# Patient Record
Sex: FEMALE | Race: WHITE | NOT HISPANIC OR LATINO | Employment: PART TIME | URBAN - METROPOLITAN AREA
[De-identification: names, ages, dates, MRNs, and addresses within clinical notes are randomized per-mention and may not be internally consistent; named-entity substitution may affect disease eponyms.]

---

## 2017-12-20 ENCOUNTER — OFFICE VISIT (OUTPATIENT)
Dept: URGENT CARE | Facility: RETAIL CLINIC | Age: 37
End: 2017-12-20
Payer: COMMERCIAL

## 2017-12-20 VITALS — TEMPERATURE: 98.4 F | SYSTOLIC BLOOD PRESSURE: 112 MMHG | HEART RATE: 80 BPM | DIASTOLIC BLOOD PRESSURE: 72 MMHG

## 2017-12-20 DIAGNOSIS — R52 BODY ACHES: ICD-10-CM

## 2017-12-20 DIAGNOSIS — J02.9 ACUTE PHARYNGITIS, UNSPECIFIED ETIOLOGY: Primary | ICD-10-CM

## 2017-12-20 LAB
FLUAV AG UPPER RESP QL IA.RAPID: NEGATIVE
FLUBV AG UPPER RESP QL IA.RAPID: NEGATIVE
S PYO AG THROAT QL IA.RAPID: NEGATIVE

## 2017-12-20 PROCEDURE — 99213 OFFICE O/P EST LOW 20 MIN: CPT | Performed by: PHYSICIAN ASSISTANT

## 2017-12-20 PROCEDURE — 87081 CULTURE SCREEN ONLY: CPT | Performed by: PHYSICIAN ASSISTANT

## 2017-12-20 PROCEDURE — 87880 STREP A ASSAY W/OPTIC: CPT | Mod: QW | Performed by: PHYSICIAN ASSISTANT

## 2017-12-20 PROCEDURE — 87804 INFLUENZA ASSAY W/OPTIC: CPT | Mod: QW | Performed by: PHYSICIAN ASSISTANT

## 2017-12-20 NOTE — PATIENT INSTRUCTIONS
Malu Dela Cruz PA-C will call with influenza test results within next 30 minutes  Rapid strep test today is negative.   Your throat culture is pending. [facility] will call you if positive results to start antibiotics at that time.  No phone call if strep culture is negative  Symptomatic treat with fluids, rest, salt water gargles, lozenges, and over the counter pain reliever, such as tylenol or ibuprofen as needed.    Remain out of activities/group settings/work when still symptomatic and until fever/body aches/chills and headache are gone for 24 hrs.. Rest. Vaporizer.  Go to the ER if any wheezing or shortness of breath develop.

## 2017-12-20 NOTE — PROGRESS NOTES
Chief Complaint   Patient presents with     Pharyngitis     x 2 days, pt did have a cough but now gone, body aches and chills x 2 days, pt not sure if feverish      SUBJECTIVE:  Esperanza Velarde is a 37 year old female with a chief complaint of sore throat.  Onset of symptoms was 2 day(s) ago.    Course of illness: sudden onset.  Severity moderate  Current and Associated symptoms: sore throat, body aches and chills for few days, cough earlier/but gone now  Treatment measures tried include Fluids.  Predisposing factors include none  Very healthy, no chronic issues.  Did not receive a flu shot this season    Past Medical History:   Diagnosis Date     Endometriosis      Infertility, female      No current outpatient prescriptions on file.      No Known Allergies     History   Smoking Status     Former Smoker   Smokeless Tobacco     Never Used       ROS:  CONSTITUTIONAL:POSITIVE  for chills and body aches and NEGATIVE  for fever   ENT/MOUTH: POSITIVE for sore throat and NEGATIVE for ear pain bilateral and nasal congestion  RESP:NEGATIVE for significant cough or wheezing    OBJECTIVE:   /72 (BP Location: Left arm)  Pulse 80  Temp 98.4  F (36.9  C) (Temporal)  GENERAL APPEARANCE: mildly ill appearing  EYES: conjunctiva clear  HENT: ear canals and TM's normal.  Nose pink with clear rhinorrhea.  Pharynx erythematous with no exudate noted.  NECK: supple, non-tender to palpation, no adenopathy noted  RESP: lungs clear to auscultation - no rales, rhonchi or wheezes  CV: regular rates and rhythm, normal S1 S2, no murmur notedl  SKIN: no suspicious lesions or rashes    Rapid Strep test is negative; await throat culture results.  Rapid influenza A neg, influenza B negative    ASSESSMENT:     Acute pharyngitis, unspecified etiology  Body aches    PLAN:   I called patient after office visit and discussed negative influenza A and negative influenza B test results. Already had given her a note for work.  Rapid strep test  today is negative.   Your throat culture is pending. [facility] will call you if positive results to start antibiotics at that time.  No phone call if strep culture is negative  Symptomatic treat with fluids, rest, salt water gargles, lozenges, and over the counter pain reliever, such as tylenol or ibuprofen as needed.    Remain out of activities/group settings/work when still symptomatic and until fever/body aches/chills and headache are gone for 24 hrs.. Rest. Vaporizer.  Go to the ER if any wheezing or shortness of breath develop.       JOS Correia [facility]

## 2017-12-20 NOTE — NURSING NOTE
"Chief Complaint   Patient presents with     Pharyngitis     x 2 days, pt did have a cough but now gone, body aches and chills x 2 days, pt not sure if feverish       Initial /72 (BP Location: Left arm)  Pulse 80  Temp 98.4  F (36.9  C) (Temporal) Estimated body mass index is 25.05 kg/(m^2) as calculated from the following:    Height as of 8/15/13: 5' 5.75\" (1.67 m).    Weight as of 8/15/13: 154 lb (69.9 kg).  Medication Reconciliation: complete    "

## 2017-12-20 NOTE — LETTER
December 20, 2017      Esperanza Velarde  [address] [address] [place] [id number]        To Whom It May Concern:    Esperanza Velarde was seen in our clinic today for acute illness. Please excuse from work missed due to this illness.       Sincerely,        Malu Dela Cruz PA-C

## 2017-12-20 NOTE — MR AVS SNAPSHOT
"              After Visit Summary   12/20/2017    Esperanza Velarde    MRN: [id number]           Patient Information     Date Of Birth          1980        Visit Information        Provider Department      12/20/2017 4:00 PM Malu Dela Cruz PA-C [facility] [place]        Today's Diagnoses     Acute pharyngitis, unspecified etiology    -  1    Body aches          Care Instructions    Malu Dela Cruz PA-C will call with influenza test results within next 30 minutes  Rapid strep test today is negative.   Your throat culture is pending. [facility] will call you if positive results to start antibiotics at that time.  No phone call if strep culture is negative  Symptomatic treat with fluids, rest, salt water gargles, lozenges, and over the counter pain reliever, such as tylenol or ibuprofen as needed.    Remain out of activities/group settings/work when still symptomatic and until fever/body aches/chills and headache are gone for 24 hrs.. Rest. Vaporizer.  Go to the ER if any wheezing or shortness of breath develop.                 Follow-ups after your visit        Who to contact     You can reach your care team any time of the day by calling [phone].  Notification of test results:  If you have an abnormal lab result, we will notify you by phone as soon as possible.         Additional Information About Your Visit        [employer] Information     [employer] lets you send messages to your doctor, view your test results, renew your prescriptions, schedule appointments and more. To sign up, go to [email or web address] . Click on \"Log in\" on the left side of the screen, which will take you to the Welcome page. Then click on \"Sign up Now\" on the right side of the page.     You will be asked to enter the access code listed below, as well as some personal information. Please follow the directions to create your username and password.     Your access code is: [email or web address]  Expires: 3/20/2018  4:27 PM   "   Your access code will  in 90 days. If you need help or a new code, please call your [facility] clinic or [phone].        Care EveryWhere ID     This is your Care EveryWhere ID. This could be used by other organizations to access your [facility] medical records  [id number]        Your Vitals Were     Pulse Temperature                80 98.4  F (36.9  C) (Temporal)           Blood Pressure from Last 3 Encounters:   17 112/72   14 131/76   08/15/13 100/70    Weight from Last 3 Encounters:   08/15/13 154 lb (69.9 kg)              We Performed the Following     BETA STREP GROUP A R/O CULTURE     INFLUENZA A/B ANTIGEN     RAPID STREP SCREEN        Primary Care Provider Fax #    Physician No Ref-Primary [phone]       No address on file        Equal Access to Services     CARYN PETERSON : Sumit isidro Soguy, waaxda luqadaha, qaybta kaalmada adeegyada, iris cyr . So [facility] [phone].    ATENCIÓN: Si habla español, tiene a ha disposición servicios gratuitos de asistencia lingüística. Llame al [phone].    We comply with applicable federal civil rights laws and [place] laws. We do not discriminate on the basis of race, color, national origin, age, disability, sex, sexual orientation, or gender identity.            Thank you!     Thank you for choosing [facility]  for your care. Our goal is always to provide you with excellent care. Hearing back from our patients is one way we can continue to improve our services. Please take a few minutes to complete the written survey that you may receive in the mail after your visit with us. Thank you!             Your Updated Medication List - Protect others around you: Learn how to safely use, store and throw away your medicines at www.disposemymeds.org.      Notice  As of 2017  4:27 PM    You have not been prescribed any medications.

## 2017-12-22 LAB — BETA STREP CONFIRM: NORMAL

## 2019-09-12 NOTE — H&P (VIEW-ONLY)
SUBJECTIVE:   CC: Esperanza Velarde is an 38 year old woman who presents for preventive health visit.     Healthy Habits:     Getting at least 3 servings of Calcium per day:  NO    Bi-annual eye exam:  NO    Dental care twice a year:  Yes    Sleep apnea or symptoms of sleep apnea:  None    Diet:  Regular (no restrictions)    Frequency of exercise:  None    Taking medications regularly:  Yes    Medication side effects:  None    PHQ-2 Total Score: 1    Additional concerns today:  Yes    Rash on chest x 2 months- itches. Does not recall anything new. Does not seem to be spreading. Tried OTC Lotrimin cream without relief.    Had hysterectomy in 2014. Has been having some mood swings and decrease libido since. Was on hormones. Really doesn't want to go a back on those if she doesn't have to.     Today's PHQ-2 Score:   PHQ-2 ( 1999 Pfizer) 9/16/2019   Q1: Little interest or pleasure in doing things 0   Q2: Feeling down, depressed or hopeless 1   PHQ-2 Score 1   Q1: Little interest or pleasure in doing things Not at all   Q2: Feeling down, depressed or hopeless Several days   PHQ-2 Score 1       Abuse: Current or Past(Physical, Sexual or Emotional)- No  Do you feel safe in your environment? Yes    Social History     Tobacco Use     Smoking status: Current Every Day Smoker     Smokeless tobacco: Never Used   Substance Use Topics     Alcohol use: Yes     Comment: rare- 3 per week     If you drink alcohol do you typically have >3 drinks per day or >7 drinks per week? No    Alcohol Use 9/16/2019   Prescreen: >3 drinks/day or >7 drinks/week? No   Prescreen: >3 drinks/day or >7 drinks/week? -     Reviewed orders with patient.  Reviewed health maintenance and updated orders accordingly - Yes  BP Readings from Last 3 Encounters:   09/16/19 104/72   12/20/17 112/72   12/29/14 131/76    Wt Readings from Last 3 Encounters:   09/16/19 68.9 kg (152 lb)   08/15/13 69.9 kg (154 lb)                  Patient Active Problem List    Diagnosis     CARDIOVASCULAR SCREENING; LDL GOAL LESS THAN 160     Past Surgical History:   Procedure Laterality Date     HYSTERECTOMY, PAP NO LONGER INDICATED       TUBAL/ECTOPIC PREGNANCY  1998    Ectopic     TUBAL/ECTOPIC PREGNANCY  2009    Tube Remove       Social History     Tobacco Use     Smoking status: Current Every Day Smoker     Smokeless tobacco: Never Used   Substance Use Topics     Alcohol use: Yes     Comment: rare- 3 per week     Family History   Problem Relation Age of Onset     Lung Cancer Mother 68        mets every where by the time they found it     Cerebrovascular Disease Mother 68     Lung Cancer Father         agent orange     Colon Cancer Maternal Grandmother 50     Lung Cancer Maternal Grandmother          Current Outpatient Medications   Medication Sig Dispense Refill     nystatin-triamcinolone (MYCOLOG) 386106-1.1 UNIT/GM-% external ointment Apply topically 2 times daily 30 g 1     venlafaxine (EFFEXOR-XR) 37.5 MG 24 hr capsule Take 1 capsule (37.5 mg) by mouth daily 90 capsule 0     No Known Allergies    Mammogram Screening: Patient under age 50, mutual decision reflected in health maintenance.      Pertinent mammograms are reviewed under the imaging tab.  History of abnormal Pap smear: Status post benign hysterectomy. Health Maintenance and Surgical History updated.  PAP / HPV 8/15/2013   PAP NIL     Reviewed and updated as needed this visit by clinical staff  Tobacco  Allergies  Meds  Problems  Med Hx  Surg Hx  Fam Hx  Soc Hx          Reviewed and updated as needed this visit by Provider  Tobacco  Allergies  Meds  Problems  Med Hx  Surg Hx  Fam Hx  Soc Hx         Review of Systems   Constitutional: Negative for chills and fever.   HENT: Negative for congestion, ear pain, hearing loss and sore throat.    Eyes: Negative for pain and visual disturbance.   Respiratory: Negative for cough and shortness of breath.    Cardiovascular: Negative for chest pain, palpitations and  "peripheral edema.   Gastrointestinal: Negative for abdominal pain, constipation, diarrhea, heartburn, hematochezia and nausea.   Genitourinary: Negative for dysuria, frequency, genital sores, hematuria and urgency.   Musculoskeletal: Negative for arthralgias, joint swelling and myalgias.   Skin: Positive for rash.   Neurological: Negative for dizziness, weakness, headaches and paresthesias.   Psychiatric/Behavioral: Negative for mood changes. The patient is not nervous/anxious.         OBJECTIVE:   /72   Pulse 80   Temp 98.3  F (36.8  C) (Temporal)   Resp 16   Ht 1.727 m (5' 8\")   Wt 68.9 kg (152 lb)   LMP 07/02/2013   SpO2 99%   BMI 23.11 kg/m    Physical Exam  GENERAL: healthy, alert and no distress  EYES: Eyes grossly normal to inspection, PERRL and conjunctivae and sclerae normal  HENT: ear canals and TM's normal, nose and mouth without ulcers or lesions  NECK: no adenopathy, no asymmetry, masses, or scars and thyroid normal to palpation  RESP: lungs clear to auscultation - no rales, rhonchi or wheezes  BREAST: normal without masses, tenderness or nipple discharge and no palpable axillary masses or adenopathy  CV: regular rate and rhythm, normal S1 S2, no S3 or S4, no murmur, click or rub, no peripheral edema and peripheral pulses strong  ABDOMEN: soft, nontender, no hepatosplenomegaly, no masses and bowel sounds normal  MS: no gross musculoskeletal defects noted, no edema  SKIN: several areas of circular erythematous rash with scaling over anterior chest  NEURO: Normal strength and tone, mentation intact and speech normal  PSYCH: mentation appears normal, affect normal/bright    Diagnostic Test Results:  Labs reviewed in Epic  Results for orders placed or performed in visit on 09/16/19 (from the past 24 hour(s))   Lipid panel reflex to direct LDL Fasting   Result Value Ref Range    Cholesterol 158 <200 mg/dL    Triglycerides 58 <150 mg/dL    HDL Cholesterol 70 >49 mg/dL    LDL Cholesterol " "Calculated 76 <100 mg/dL    Non HDL Cholesterol 88 <130 mg/dL   Comprehensive metabolic panel (BMP + Alb, Alk Phos, ALT, AST, Total. Bili, TP)   Result Value Ref Range    Sodium 142 133 - 144 mmol/L    Potassium 3.7 3.4 - 5.3 mmol/L    Chloride 105 94 - 109 mmol/L    Carbon Dioxide 28 20 - 32 mmol/L    Anion Gap 9 3 - 14 mmol/L    Glucose 92 70 - 99 mg/dL    Urea Nitrogen 13 7 - 30 mg/dL    Creatinine 0.69 0.52 - 1.04 mg/dL    GFR Estimate >90 >60 mL/min/[1.73_m2]    GFR Estimate If Black >90 >60 mL/min/[1.73_m2]    Calcium 8.9 8.5 - 10.1 mg/dL    Bilirubin Total 0.3 0.2 - 1.3 mg/dL    Albumin 3.9 3.4 - 5.0 g/dL    Protein Total 7.4 6.8 - 8.8 g/dL    Alkaline Phosphatase 126 40 - 150 U/L    ALT 24 0 - 50 U/L    AST 18 0 - 45 U/L       ASSESSMENT/PLAN:   1. Routine general medical examination at a health care facility    2. Post menopausal syndrome  Discussed treatment options in detail today including risks associated with HRT. Patient will trial low dose Effexor at this time. Side effects discussed.   - venlafaxine (EFFEXOR-XR) 37.5 MG 24 hr capsule; Take 1 capsule (37.5 mg) by mouth daily  Dispense: 90 capsule; Refill: 0    3. Encounter for screening mammogram for breast cancer  - *MA Screening Digital Bilateral; Future    4. Family history of colon cancer  Strong family history. Will place orders for colonscopy.   - GASTROENTEROLOGY ADULT REF PROCEDURE ONLY    5. Lipid screening  - Lipid panel reflex to direct LDL Fasting    6. Screening for diabetes mellitus  - Comprehensive metabolic panel (BMP + Alb, Alk Phos, ALT, AST, Total. Bili, TP)    7. Rash  - nystatin-triamcinolone (MYCOLOG) 553883-8.1 UNIT/GM-% external ointment; Apply topically 2 times daily  Dispense: 30 g; Refill: 1    COUNSELING:  Reviewed preventive health counseling, as reflected in patient instructions    Estimated body mass index is 23.11 kg/m  as calculated from the following:    Height as of this encounter: 1.727 m (5' 8\").    Weight as of " this encounter: 68.9 kg (152 lb).       reports that she has been smoking.  She has never used smokeless tobacco.  Tobacco Cessation Action Plan: Information offered: Patient not interested at this time    Counseling Resources:  ATP IV Guidelines  Pooled Cohorts Equation Calculator  Breast Cancer Risk Calculator  FRAX Risk Assessment  ICSI Preventive Guidelines  Dietary Guidelines for Americans, 2010  USDA's MyPlate  ASA Prophylaxis  Lung CA Screening    Malu Rae PA-C  [facility]

## 2019-09-12 NOTE — PROGRESS NOTES
SUBJECTIVE:   CC: Esperanza Velarde is an 38 year old woman who presents for preventive health visit.     Healthy Habits:     Getting at least 3 servings of Calcium per day:  NO    Bi-annual eye exam:  NO    Dental care twice a year:  Yes    Sleep apnea or symptoms of sleep apnea:  None    Diet:  Regular (no restrictions)    Frequency of exercise:  None    Taking medications regularly:  Yes    Medication side effects:  None    PHQ-2 Total Score: 1    Additional concerns today:  Yes    Rash on chest x 2 months- itches. Does not recall anything new. Does not seem to be spreading. Tried OTC Lotrimin cream without relief.    Had hysterectomy in 2014. Has been having some mood swings and decrease libido since. Was on hormones. Really doesn't want to go a back on those if she doesn't have to.     Today's PHQ-2 Score:   PHQ-2 ( 1999 [employer]) 9/16/2019   Q1: Little interest or pleasure in doing things 0   Q2: Feeling down, depressed or hopeless 1   PHQ-2 Score 1   Q1: Little interest or pleasure in doing things Not at all   Q2: Feeling down, depressed or hopeless Several days   PHQ-2 Score 1       Abuse: Current or Past(Physical, Sexual or Emotional)- No  Do you feel safe in your environment? Yes    Social History     Tobacco Use     Smoking status: Current Every Day Smoker     Smokeless tobacco: Never Used   Substance Use Topics     Alcohol use: Yes     Comment: rare- 3 per week     If you drink alcohol do you typically have >3 drinks per day or >7 drinks per week? No    Alcohol Use 9/16/2019   Prescreen: >3 drinks/day or >7 drinks/week? No   Prescreen: >3 drinks/day or >7 drinks/week? -     Reviewed orders with patient.  Reviewed health maintenance and updated orders accordingly - Yes  BP Readings from Last 3 Encounters:   09/16/19 104/72   12/20/17 112/72   12/29/14 131/76    Wt Readings from Last 3 Encounters:   09/16/19 68.9 kg (152 lb)   08/15/13 69.9 kg (154 lb)                  Patient Active Problem List    Diagnosis     CARDIOVASCULAR SCREENING; LDL GOAL LESS THAN 160     Past Surgical History:   Procedure Laterality Date     HYSTERECTOMY, PAP NO LONGER INDICATED       TUBAL/ECTOPIC PREGNANCY  1998    Ectopic     TUBAL/ECTOPIC PREGNANCY  2009    Tube Remove       Social History     Tobacco Use     Smoking status: Current Every Day Smoker     Smokeless tobacco: Never Used   Substance Use Topics     Alcohol use: Yes     Comment: rare- 3 per week     Family History   Problem Relation Age of Onset     Lung Cancer Mother 68        mets every where by the time they found it     Cerebrovascular Disease Mother 68     Lung Cancer Father         agent orange     Colon Cancer Maternal Grandmother 50     Lung Cancer Maternal Grandmother          Current Outpatient Medications   Medication Sig Dispense Refill     nystatin-triamcinolone (MYCOLOG) 383348-2.1 UNIT/GM-% external ointment Apply topically 2 times daily 30 g 1     venlafaxine (EFFEXOR-XR) 37.5 MG 24 hr capsule Take 1 capsule (37.5 mg) by mouth daily 90 capsule 0     No Known Allergies    Mammogram Screening: Patient under age 50, mutual decision reflected in health maintenance.      Pertinent mammograms are reviewed under the imaging tab.  History of abnormal Pap smear: Status post benign hysterectomy. Health Maintenance and Surgical History updated.  PAP / HPV 8/15/2013   PAP NIL     Reviewed and updated as needed this visit by clinical staff  Tobacco  Allergies  Meds  Problems  Med Hx  Surg Hx  Fam Hx  Soc Hx          Reviewed and updated as needed this visit by Provider  Tobacco  Allergies  Meds  Problems  Med Hx  Surg Hx  Fam Hx  Soc Hx         Review of Systems   Constitutional: Negative for chills and fever.   HENT: Negative for congestion, ear pain, hearing loss and sore throat.    Eyes: Negative for pain and visual disturbance.   Respiratory: Negative for cough and shortness of breath.    Cardiovascular: Negative for chest pain, palpitations and  "peripheral edema.   Gastrointestinal: Negative for abdominal pain, constipation, diarrhea, heartburn, hematochezia and nausea.   Genitourinary: Negative for dysuria, frequency, genital sores, hematuria and urgency.   Musculoskeletal: Negative for arthralgias, joint swelling and myalgias.   Skin: Positive for rash.   Neurological: Negative for dizziness, weakness, headaches and paresthesias.   Psychiatric/Behavioral: Negative for mood changes. The patient is not nervous/anxious.         OBJECTIVE:   /72   Pulse 80   Temp 98.3  F (36.8  C) (Temporal)   Resp 16   Ht 1.727 m (5' 8\")   Wt 68.9 kg (152 lb)   LMP 07/02/2013   SpO2 99%   BMI 23.11 kg/m    Physical Exam  GENERAL: healthy, alert and no distress  EYES: Eyes grossly normal to inspection, PERRL and conjunctivae and sclerae normal  HENT: ear canals and TM's normal, nose and mouth without ulcers or lesions  NECK: no adenopathy, no asymmetry, masses, or scars and thyroid normal to palpation  RESP: lungs clear to auscultation - no rales, rhonchi or wheezes  BREAST: normal without masses, tenderness or nipple discharge and no palpable axillary masses or adenopathy  CV: regular rate and rhythm, normal S1 S2, no S3 or S4, no murmur, click or rub, no peripheral edema and peripheral pulses strong  ABDOMEN: soft, nontender, no hepatosplenomegaly, no masses and bowel sounds normal  MS: no gross musculoskeletal defects noted, no edema  SKIN: several areas of circular erythematous rash with scaling over anterior chest  NEURO: Normal strength and tone, mentation intact and speech normal  PSYCH: mentation appears normal, affect normal/bright    Diagnostic Test Results:  Labs reviewed in Epic  Results for orders placed or performed in visit on 09/16/19 (from the past 24 hour(s))   Lipid panel reflex to direct LDL Fasting   Result Value Ref Range    Cholesterol 158 <200 mg/dL    Triglycerides 58 <150 mg/dL    HDL Cholesterol 70 >49 mg/dL    LDL Cholesterol " "Calculated 76 <100 mg/dL    Non HDL Cholesterol 88 <130 mg/dL   Comprehensive metabolic panel (BMP + Alb, Alk Phos, ALT, AST, Total. Bili, TP)   Result Value Ref Range    Sodium 142 133 - 144 mmol/L    Potassium 3.7 3.4 - 5.3 mmol/L    Chloride 105 94 - 109 mmol/L    Carbon Dioxide 28 20 - 32 mmol/L    Anion Gap 9 3 - 14 mmol/L    Glucose 92 70 - 99 mg/dL    Urea Nitrogen 13 7 - 30 mg/dL    Creatinine 0.69 0.52 - 1.04 mg/dL    GFR Estimate >90 >60 mL/min/[1.73_m2]    GFR Estimate If Black >90 >60 mL/min/[1.73_m2]    Calcium 8.9 8.5 - 10.1 mg/dL    Bilirubin Total 0.3 0.2 - 1.3 mg/dL    Albumin 3.9 3.4 - 5.0 g/dL    Protein Total 7.4 6.8 - 8.8 g/dL    Alkaline Phosphatase 126 40 - 150 U/L    ALT 24 0 - 50 U/L    AST 18 0 - 45 U/L       ASSESSMENT/PLAN:   1. Routine general medical examination at a health care facility    2. Post menopausal syndrome  Discussed treatment options in detail today including risks associated with HRT. Patient will trial low dose Effexor at this time. Side effects discussed.   - venlafaxine (EFFEXOR-XR) 37.5 MG 24 hr capsule; Take 1 capsule (37.5 mg) by mouth daily  Dispense: 90 capsule; Refill: 0    3. Encounter for screening mammogram for breast cancer  - *MA Screening Digital Bilateral; Future    4. Family history of colon cancer  Strong family history. Will place orders for colonscopy.   - GASTROENTEROLOGY ADULT REF PROCEDURE ONLY    5. Lipid screening  - Lipid panel reflex to direct LDL Fasting    6. Screening for diabetes mellitus  - Comprehensive metabolic panel (BMP + Alb, Alk Phos, ALT, AST, Total. Bili, TP)    7. Rash  - nystatin-triamcinolone (MYCOLOG) 221357-3.1 UNIT/GM-% external ointment; Apply topically 2 times daily  Dispense: 30 g; Refill: 1    COUNSELING:  Reviewed preventive health counseling, as reflected in patient instructions    Estimated body mass index is 23.11 kg/m  as calculated from the following:    Height as of this encounter: 1.727 m (5' 8\").    Weight as of " this encounter: 68.9 kg (152 lb).       reports that she has been smoking.  She has never used smokeless tobacco.  Tobacco Cessation Action Plan: Information offered: Patient not interested at this time    Counseling Resources:  ATP IV Guidelines  Pooled Cohorts Equation Calculator  Breast Cancer Risk Calculator  FRAX Risk Assessment  ICSI Preventive Guidelines  Dietary Guidelines for Americans, 2010  USDA's MyPlate  ASA Prophylaxis  Lung CA Screening    Malu Rae PA-C  [facility]

## 2019-09-16 ENCOUNTER — OFFICE VISIT (OUTPATIENT)
Dept: FAMILY MEDICINE | Facility: OTHER | Age: 39
End: 2019-09-16
Payer: COMMERCIAL

## 2019-09-16 VITALS
HEART RATE: 80 BPM | HEIGHT: 68 IN | BODY MASS INDEX: 23.04 KG/M2 | DIASTOLIC BLOOD PRESSURE: 72 MMHG | OXYGEN SATURATION: 99 % | TEMPERATURE: 98.3 F | WEIGHT: 152 LBS | RESPIRATION RATE: 16 BRPM | SYSTOLIC BLOOD PRESSURE: 104 MMHG

## 2019-09-16 DIAGNOSIS — Z13.1 SCREENING FOR DIABETES MELLITUS: ICD-10-CM

## 2019-09-16 DIAGNOSIS — Z13.220 LIPID SCREENING: ICD-10-CM

## 2019-09-16 DIAGNOSIS — R21 RASH: ICD-10-CM

## 2019-09-16 DIAGNOSIS — N95.1 POST MENOPAUSAL SYNDROME: ICD-10-CM

## 2019-09-16 DIAGNOSIS — Z00.00 ROUTINE GENERAL MEDICAL EXAMINATION AT A HEALTH CARE FACILITY: Primary | ICD-10-CM

## 2019-09-16 DIAGNOSIS — Z80.0 FAMILY HISTORY OF COLON CANCER: ICD-10-CM

## 2019-09-16 DIAGNOSIS — Z12.31 ENCOUNTER FOR SCREENING MAMMOGRAM FOR BREAST CANCER: ICD-10-CM

## 2019-09-16 LAB
ALBUMIN SERPL-MCNC: 3.9 G/DL (ref 3.4–5)
ALP SERPL-CCNC: 126 U/L (ref 40–150)
ALT SERPL W P-5'-P-CCNC: 24 U/L (ref 0–50)
ANION GAP SERPL CALCULATED.3IONS-SCNC: 9 MMOL/L (ref 3–14)
AST SERPL W P-5'-P-CCNC: 18 U/L (ref 0–45)
BILIRUB SERPL-MCNC: 0.3 MG/DL (ref 0.2–1.3)
BUN SERPL-MCNC: 13 MG/DL (ref 7–30)
CALCIUM SERPL-MCNC: 8.9 MG/DL (ref 8.5–10.1)
CHLORIDE SERPL-SCNC: 105 MMOL/L (ref 94–109)
CHOLEST SERPL-MCNC: 158 MG/DL
CO2 SERPL-SCNC: 28 MMOL/L (ref 20–32)
CREAT SERPL-MCNC: 0.69 MG/DL (ref 0.52–1.04)
GFR SERPL CREATININE-BSD FRML MDRD: >90 ML/MIN/{1.73_M2}
GLUCOSE SERPL-MCNC: 92 MG/DL (ref 70–99)
HDLC SERPL-MCNC: 70 MG/DL
LDLC SERPL CALC-MCNC: 76 MG/DL
NONHDLC SERPL-MCNC: 88 MG/DL
POTASSIUM SERPL-SCNC: 3.7 MMOL/L (ref 3.4–5.3)
PROT SERPL-MCNC: 7.4 G/DL (ref 6.8–8.8)
SODIUM SERPL-SCNC: 142 MMOL/L (ref 133–144)
TRIGL SERPL-MCNC: 58 MG/DL

## 2019-09-16 PROCEDURE — 99386 PREV VISIT NEW AGE 40-64: CPT | Performed by: PHYSICIAN ASSISTANT

## 2019-09-16 PROCEDURE — 80053 COMPREHEN METABOLIC PANEL: CPT | Performed by: PHYSICIAN ASSISTANT

## 2019-09-16 PROCEDURE — 99214 OFFICE O/P EST MOD 30 MIN: CPT | Mod: 25 | Performed by: PHYSICIAN ASSISTANT

## 2019-09-16 PROCEDURE — 36415 COLL VENOUS BLD VENIPUNCTURE: CPT | Performed by: PHYSICIAN ASSISTANT

## 2019-09-16 PROCEDURE — 80061 LIPID PANEL: CPT | Performed by: PHYSICIAN ASSISTANT

## 2019-09-16 RX ORDER — VENLAFAXINE HYDROCHLORIDE 37.5 MG/1
37.5 CAPSULE, EXTENDED RELEASE ORAL DAILY
Qty: 90 CAPSULE | Refills: 0 | Status: SHIPPED | OUTPATIENT
Start: 2019-09-16 | End: 2020-03-11

## 2019-09-16 RX ORDER — NYSTATIN AND TRIAMCINOLONE ACETONIDE 100000; 1 [USP'U]/G; MG/G
OINTMENT TOPICAL 2 TIMES DAILY
Qty: 30 G | Refills: 1 | Status: SHIPPED | OUTPATIENT
Start: 2019-09-16 | End: 2020-03-11

## 2019-09-16 ASSESSMENT — MIFFLIN-ST. JEOR: SCORE: 1407.97

## 2019-09-16 ASSESSMENT — ENCOUNTER SYMPTOMS
ABDOMINAL PAIN: 0
HEMATURIA: 0
COUGH: 0
HEMATOCHEZIA: 0
NAUSEA: 0
DYSURIA: 0
DIZZINESS: 0
SORE THROAT: 0
FEVER: 0
ARTHRALGIAS: 0
PALPITATIONS: 0
PARESTHESIAS: 0
WEAKNESS: 0
SHORTNESS OF BREATH: 0
FREQUENCY: 0
JOINT SWELLING: 0
CONSTIPATION: 0
NERVOUS/ANXIOUS: 0
EYE PAIN: 0
CHILLS: 0
MYALGIAS: 0
HEADACHES: 0
DIARRHEA: 0
HEARTBURN: 0

## 2019-09-16 NOTE — LETTER
September 17, 2019      Esperanza Velarde  [address] [address] [place] [id number]        Dear ,    We are writing to inform you of your test results.    Your test results fall within the expected range(s) or remain unchanged from previous results.  Please continue with current treatment plan.    Resulted Orders   Lipid panel reflex to direct LDL Fasting   Result Value Ref Range    Cholesterol 158 <200 mg/dL    Triglycerides 58 <150 mg/dL    HDL Cholesterol 70 >49 mg/dL    LDL Cholesterol Calculated 76 <100 mg/dL      Comment:      Desirable:       <100 mg/dl    Non HDL Cholesterol 88 <130 mg/dL   Comprehensive metabolic panel (BMP + Alb, Alk Phos, ALT, AST, Total. Bili, TP)   Result Value Ref Range    Sodium 142 133 - 144 mmol/L    Potassium 3.7 3.4 - 5.3 mmol/L    Chloride 105 94 - 109 mmol/L    Carbon Dioxide 28 20 - 32 mmol/L    Anion Gap 9 3 - 14 mmol/L    Glucose 92 70 - 99 mg/dL    Urea Nitrogen 13 7 - 30 mg/dL    Creatinine 0.69 0.52 - 1.04 mg/dL    GFR Estimate >90 >60 mL/min/[1.73_m2]      Comment:      Non  GFR Calc  Starting 12/18/2018, serum creatinine based estimated GFR (eGFR) will be   calculated using the Chronic Kidney Disease Epidemiology Collaboration   (CKD-EPI) equation.      GFR Estimate If Black >90 >60 mL/min/[1.73_m2]      Comment:       GFR Calc  Starting 12/18/2018, serum creatinine based estimated GFR (eGFR) will be   calculated using the Chronic Kidney Disease Epidemiology Collaboration   (CKD-EPI) equation.      Calcium 8.9 8.5 - 10.1 mg/dL    Bilirubin Total 0.3 0.2 - 1.3 mg/dL    Albumin 3.9 3.4 - 5.0 g/dL    Protein Total 7.4 6.8 - 8.8 g/dL    Alkaline Phosphatase 126 40 - 150 U/L    ALT 24 0 - 50 U/L    AST 18 0 - 45 U/L       If you have any questions or concerns, please call the clinic at the number listed above.       Sincerely,        Malu Rae PA-C

## 2019-09-17 ENCOUNTER — TELEPHONE (OUTPATIENT)
Dept: FAMILY MEDICINE | Facility: OTHER | Age: 39
End: 2019-09-17

## 2019-09-17 NOTE — LETTER

## 2019-09-17 NOTE — RESULT ENCOUNTER NOTE
Please notify patient that all of her labs looked excellent. OK to send normal lab letter.     Malu Rae PA-C

## 2019-09-17 NOTE — TELEPHONE ENCOUNTER
Left message for patient to return call to schedule colonoscopy or EGD. If Arlin or Atiya are unavailable, please transfer to the surgery center.

## 2019-09-17 NOTE — LETTER
[facility]  [address]  [place] [place] [address]  [phone]        September 18, 2019    Esperanza Velarde  [address] [address] [place] [id number]

## 2019-09-18 DIAGNOSIS — R21 RASH: Primary | ICD-10-CM

## 2019-09-18 RX ORDER — CLOTRIMAZOLE AND BETAMETHASONE DIPROPIONATE 10; .64 MG/G; MG/G
CREAM TOPICAL 2 TIMES DAILY
Qty: 45 G | Refills: 1 | Status: SHIPPED | OUTPATIENT
Start: 2019-09-18 | End: 2020-03-11

## 2019-09-18 NOTE — TELEPHONE ENCOUNTER
Date of colonoscopy/EGD: 10/8/19  Surgeon: Dr. Montoya  Prep:Miralax  Packet:Colonoscopy/EGD instructions mailed to patient's home address.   Date: 9/18/2019      Surgery Scheduler

## 2019-09-26 ENCOUNTER — HOSPITAL ENCOUNTER (OUTPATIENT)
Dept: MAMMOGRAPHY | Facility: CLINIC | Age: 39
Discharge: HOME | End: 2019-09-26
Attending: PHYSICIAN ASSISTANT | Admitting: PHYSICIAN ASSISTANT
Payer: COMMERCIAL

## 2019-09-26 DIAGNOSIS — Z12.31 VISIT FOR SCREENING MAMMOGRAM: ICD-10-CM

## 2019-09-26 PROCEDURE — 77063 BREAST TOMOSYNTHESIS BI: CPT

## 2019-09-30 ENCOUNTER — TELEPHONE (OUTPATIENT)
Dept: FAMILY MEDICINE | Facility: OTHER | Age: 39
End: 2019-09-30

## 2019-09-30 DIAGNOSIS — R92.8 ABNORMAL MAMMOGRAM: Primary | ICD-10-CM

## 2019-09-30 NOTE — TELEPHONE ENCOUNTER
----- Message from Malu Rae PA-C sent at 9/30/2019  8:52 AM CDT -----  Please notify patient that mammogram showed a few small asymmetries in the right breast. It is recommended she have an ultrasound to ensure no concerning findings. Please give patient number for breast imaging to schedule.     Malu Rae PA-C

## 2019-09-30 NOTE — RESULT ENCOUNTER NOTE
Please notify patient that mammogram showed a few small asymmetries in the right breast. It is recommended she have an ultrasound to ensure no concerning findings. Please give patient number for breast imaging to schedule.     Malu Rae PA-C

## 2019-10-07 ENCOUNTER — HOSPITAL ENCOUNTER (OUTPATIENT)
Dept: ULTRASOUND IMAGING | Facility: CLINIC | Age: 39
Discharge: HOME | End: 2019-10-07
Attending: PHYSICIAN ASSISTANT | Admitting: PHYSICIAN ASSISTANT
Payer: COMMERCIAL

## 2019-10-07 DIAGNOSIS — R92.8 ABNORMAL MAMMOGRAM: ICD-10-CM

## 2019-10-07 PROCEDURE — 76642 ULTRASOUND BREAST LIMITED: CPT | Mod: 50

## 2019-10-08 ENCOUNTER — ANESTHESIA (OUTPATIENT)
Dept: GASTROENTEROLOGY | Facility: CLINIC | Age: 39
End: 2019-10-08
Payer: COMMERCIAL

## 2019-10-08 ENCOUNTER — HOSPITAL ENCOUNTER (OUTPATIENT)
Facility: CLINIC | Age: 39
Discharge: HOME | End: 2019-10-08
Attending: SURGERY | Admitting: SURGERY
Payer: COMMERCIAL

## 2019-10-08 ENCOUNTER — ANESTHESIA EVENT (OUTPATIENT)
Dept: GASTROENTEROLOGY | Facility: CLINIC | Age: 39
End: 2019-10-08
Payer: COMMERCIAL

## 2019-10-08 VITALS
DIASTOLIC BLOOD PRESSURE: 60 MMHG | OXYGEN SATURATION: 99 % | SYSTOLIC BLOOD PRESSURE: 93 MMHG | TEMPERATURE: 98.2 F | RESPIRATION RATE: 18 BRPM | HEART RATE: 69 BPM

## 2019-10-08 LAB — COLONOSCOPY: NORMAL

## 2019-10-08 PROCEDURE — 45378 DIAGNOSTIC COLONOSCOPY: CPT | Performed by: SURGERY

## 2019-10-08 PROCEDURE — 40000296 ZZH STATISTIC ENDO RECOVERY CLASS 1:2 FIRST HOUR: Performed by: SURGERY

## 2019-10-08 PROCEDURE — 25000125 ZZHC RX 250: Performed by: SURGERY

## 2019-10-08 PROCEDURE — 25000128 H RX IP 250 OP 636: Performed by: NURSE ANESTHETIST, CERTIFIED REGISTERED

## 2019-10-08 PROCEDURE — 37000008 ZZH ANESTHESIA TECHNICAL FEE, 1ST 30 MIN: Performed by: SURGERY

## 2019-10-08 PROCEDURE — G0121 COLON CA SCRN NOT HI RSK IND: HCPCS | Performed by: SURGERY

## 2019-10-08 PROCEDURE — 25800030 ZZH RX IP 258 OP 636: Performed by: NURSE ANESTHETIST, CERTIFIED REGISTERED

## 2019-10-08 PROCEDURE — 25000125 ZZHC RX 250: Performed by: NURSE ANESTHETIST, CERTIFIED REGISTERED

## 2019-10-08 RX ORDER — NALOXONE HYDROCHLORIDE 0.4 MG/ML
.1-.4 INJECTION, SOLUTION INTRAMUSCULAR; INTRAVENOUS; SUBCUTANEOUS
Status: DISCONTINUED | OUTPATIENT
Start: 2019-10-08 | End: 2019-10-08 | Stop reason: HOSPADM

## 2019-10-08 RX ORDER — FLUMAZENIL 0.1 MG/ML
0.2 INJECTION, SOLUTION INTRAVENOUS
Status: DISCONTINUED | OUTPATIENT
Start: 2019-10-08 | End: 2019-10-08 | Stop reason: HOSPADM

## 2019-10-08 RX ORDER — LIDOCAINE HYDROCHLORIDE 20 MG/ML
INJECTION, SOLUTION INFILTRATION; PERINEURAL PRN
Status: DISCONTINUED | OUTPATIENT
Start: 2019-10-08 | End: 2019-10-08

## 2019-10-08 RX ORDER — PROPOFOL 10 MG/ML
INJECTION, EMULSION INTRAVENOUS CONTINUOUS PRN
Status: DISCONTINUED | OUTPATIENT
Start: 2019-10-08 | End: 2019-10-08

## 2019-10-08 RX ORDER — LIDOCAINE 40 MG/G
CREAM TOPICAL
Status: DISCONTINUED | OUTPATIENT
Start: 2019-10-08 | End: 2019-10-08 | Stop reason: HOSPADM

## 2019-10-08 RX ORDER — SODIUM CHLORIDE, SODIUM LACTATE, POTASSIUM CHLORIDE, CALCIUM CHLORIDE 600; 310; 30; 20 MG/100ML; MG/100ML; MG/100ML; MG/100ML
INJECTION, SOLUTION INTRAVENOUS CONTINUOUS
Status: DISCONTINUED | OUTPATIENT
Start: 2019-10-08 | End: 2019-10-08 | Stop reason: HOSPADM

## 2019-10-08 RX ORDER — ONDANSETRON 4 MG/1
4 TABLET, ORALLY DISINTEGRATING ORAL EVERY 6 HOURS PRN
Status: DISCONTINUED | OUTPATIENT
Start: 2019-10-08 | End: 2019-10-08 | Stop reason: HOSPADM

## 2019-10-08 RX ORDER — ONDANSETRON 2 MG/ML
4 INJECTION INTRAMUSCULAR; INTRAVENOUS EVERY 6 HOURS PRN
Status: DISCONTINUED | OUTPATIENT
Start: 2019-10-08 | End: 2019-10-08 | Stop reason: HOSPADM

## 2019-10-08 RX ORDER — ONDANSETRON 2 MG/ML
4 INJECTION INTRAMUSCULAR; INTRAVENOUS
Status: DISCONTINUED | OUTPATIENT
Start: 2019-10-08 | End: 2019-10-08 | Stop reason: HOSPADM

## 2019-10-08 RX ORDER — PROPOFOL 10 MG/ML
INJECTION, EMULSION INTRAVENOUS PRN
Status: DISCONTINUED | OUTPATIENT
Start: 2019-10-08 | End: 2019-10-08

## 2019-10-08 RX ADMIN — PROPOFOL 150 MCG/KG/MIN: 10 INJECTION, EMULSION INTRAVENOUS at 09:30

## 2019-10-08 RX ADMIN — PROPOFOL 60 MG: 10 INJECTION, EMULSION INTRAVENOUS at 09:30

## 2019-10-08 RX ADMIN — SODIUM CHLORIDE, POTASSIUM CHLORIDE, SODIUM LACTATE AND CALCIUM CHLORIDE: 600; 310; 30; 20 INJECTION, SOLUTION INTRAVENOUS at 08:34

## 2019-10-08 RX ADMIN — LIDOCAINE HYDROCHLORIDE 1 ML: 10 INJECTION, SOLUTION EPIDURAL; INFILTRATION; INTRACAUDAL; PERINEURAL at 08:34

## 2019-10-08 RX ADMIN — LIDOCAINE HYDROCHLORIDE 40 MG: 20 INJECTION, SOLUTION INFILTRATION; PERINEURAL at 09:30

## 2019-10-08 ASSESSMENT — LIFESTYLE VARIABLES: TOBACCO_USE: 1

## 2019-10-08 NOTE — ANESTHESIA PREPROCEDURE EVALUATION
Anesthesia Pre-Procedure Evaluation    Patient: Esperanza Velarde   MRN: [id number] : 1980          Preoperative Diagnosis: Family history of colon cancer    Procedure(s):  COLONOSCOPY    Past Medical History:   Diagnosis Date     Endometriosis      Infertility, female      Past Surgical History:   Procedure Laterality Date     HYSTERECTOMY, PAP NO LONGER INDICATED       TUBAL/ECTOPIC PREGNANCY      Ectopic     TUBAL/ECTOPIC PREGNANCY      Tube Remove       Anesthesia Evaluation     . Pt has had prior anesthetic. Type: General and MAC    No history of anesthetic complications          ROS/MED HX    ENT/Pulmonary:     (+)tobacco use, Current use , . .    Neurologic:  - neg neurologic ROS     Cardiovascular:  - neg cardiovascular ROS   (+) ----. : . . . :. . No previous cardiac testing       METS/Exercise Tolerance:  >4 METS   Hematologic:  - neg hematologic  ROS       Musculoskeletal:  - neg musculoskeletal ROS       GI/Hepatic:  - neg GI/hepatic ROS       Renal/Genitourinary:  - ROS Renal section negative       Endo:  - neg endo ROS       Psychiatric:  - neg psychiatric ROS       Infectious Disease:  - neg infectious disease ROS       Malignancy:      - no malignancy   Other:    - neg other ROS                      Physical Exam  Normal systems: cardiovascular, pulmonary and dental    Airway   Mallampati: II  TM distance: >3 FB  Neck ROM: full    Dental     Cardiovascular   Rhythm and rate: regular and normal      Pulmonary    breath sounds clear to auscultation            Lab Results   Component Value Date     2019    POTASSIUM 3.7 2019    CHLORIDE 105 2019    CO2 28 2019    BUN 13 2019    CR 0.69 2019    GLC 92 2019    LEESA 8.9 2019    ALBUMIN 3.9 2019    PROTTOTAL 7.4 2019    ALT 24 2019    AST 18 2019    ALKPHOS 126 2019    BILITOTAL 0.3 2019       Preop Vitals  BP Readings from Last 3 Encounters:  "  09/16/19 104/72   12/20/17 112/72   12/29/14 131/76    Pulse Readings from Last 3 Encounters:   09/16/19 80   12/20/17 80   12/29/14 101      Resp Readings from Last 3 Encounters:   09/16/19 16    SpO2 Readings from Last 3 Encounters:   09/16/19 99%      Temp Readings from Last 1 Encounters:   09/16/19 98.3  F (36.8  C) (Temporal)    Ht Readings from Last 1 Encounters:   09/16/19 1.727 m (5' 8\")      Wt Readings from Last 1 Encounters:   09/16/19 68.9 kg (152 lb)    Estimated body mass index is 23.11 kg/m  as calculated from the following:    Height as of 9/16/19: 1.727 m (5' 8\").    Weight as of 9/16/19: 68.9 kg (152 lb).       Anesthesia Plan      History & Physical Review  History and physical reviewed and following examination; no interval change.    ASA Status:  1 .    NPO Status:  > 8 hours    Plan for MAC with Intravenous and Propofol induction. Maintenance will be TIVA.  Reason for MAC:  Deep or markedly invasive procedure (G8)    All available and pertinent medical records and test results reviewed.    Risks, including but not limited to airway injury, bronchospasm, hypoxemia, PONV discussed with patient    Patient evaluated and examined prior to procedure, questions, concerns addressed and answered.        Postoperative Care  Postoperative pain management:  Multi-modal analgesia.      Consents  Anesthetic plan, risks, benefits and alternatives discussed with:  Patient.  Use of blood products discussed: No .   .                 ELIZA Stanford CRNA  "

## 2019-10-08 NOTE — ANESTHESIA POSTPROCEDURE EVALUATION
Patient: Esperanza Velarde    Procedure(s):  COLONOSCOPY    Diagnosis:Family history of colon cancer  Diagnosis Additional Information: No value filed.    Anesthesia Type:  MAC    Note:  Anesthesia Post Evaluation    Patient location during evaluation: Phase 2  Patient participation: Able to fully participate in evaluation  Level of consciousness: awake and alert  Pain management: adequate  Airway patency: patent  Cardiovascular status: acceptable and stable  Respiratory status: acceptable and room air  Hydration status: acceptable  PONV: none     Anesthetic complications: None    Comments:  Patient was happy with the anesthesia care received and no anesthesia related complications were noted.  I will follow up with the patient again if it is needed.        Last vitals:  Vitals:    10/08/19 0818 10/08/19 0953 10/08/19 0954   BP: 120/86 93/60    Pulse: 84 69    Resp: 18     Temp: 98.2  F (36.8  C)     SpO2: 99%  99%         Electronically Signed By: ELIZA Stanford CRNA  October 8, 2019  11:15 AM

## 2019-10-08 NOTE — ANESTHESIA CARE TRANSFER NOTE
Patient: Esperanza Velarde    Procedure(s):  COLONOSCOPY    Diagnosis: Family history of colon cancer  Diagnosis Additional Information: No value filed.    Anesthesia Type:   MAC     Note:  Airway :Face Mask  Patient transferred to:Phase II  Handoff Report: Identifed the Patient, Identified the Reponsible Provider, Reviewed the pertinent medical history, Discussed the surgical course, Reviewed Intra-OP anesthesia mangement and issues during anesthesia, Set expectations for post-procedure period and Allowed opportunity for questions and acknowledgement of understanding      Vitals: (Last set prior to Anesthesia Care Transfer)    CRNA VITALS  10/8/2019 0916 - 10/8/2019 1016      10/8/2019             Resp Rate (observed):  13                Electronically Signed By: ELIZA Stanford CRNA  October 8, 2019  11:14 AM

## 2019-10-08 NOTE — DISCHARGE INSTRUCTIONS
[facility]    Home Care Following Endoscopy          Activity:    You have just undergone an endoscopic procedure usually performed with conscious sedation.  Do not work or operate machinery (including a car) for at least 12 hours.      I encourage you to walk and attempt to pass this air as soon as possible.    Diet:    Return to the diet you were on before your procedure but eat lightly for the first 12-24 hours.    Drink plenty of water.    Resume any regular medications unless otherwise advised by your physician.  Please begin any new medication prescribed as a result of your procedure as directed by your physician.     If you had any biopsy or polyp removed please refrain from aspirin or aspirin products for 2 days.  If on Coumadin please restart as instructed by your physician.   Pain:    You may take Tylenol as needed for pain.  Expected Recovery:    You can expect some mild abdominal fullness and/or discomfort due to the air used to inflate your intestinal tract. It is also normal to have a mild sore throat after upper endoscopy.    Call Your Physician if You Have:    After Colonoscopy:  o Worsening persisting abdominal pain which is worse with activity.  o Fevers (>101 degrees F), chills or shakes.  o Passage of continued blood with bowel movements.   Any questions or concerns about your recovery, please call [phone] or after hours [phone] Nurse Advice Line.    Follow-up Care:    You should receive a call or letter with your results within 1 week. Please call if you have not received a notification of your results.  If asked to return to clinic please make an appointment 1 week after your procedure.  Call [phone].

## 2020-01-27 NOTE — PROGRESS NOTES
Subjective     Esperanza Velarde is a 39 year old female who presents to clinic today for the following health issues:    HPI   Concern - Sex Drive       Description:   Had a hyst  Years ago, stopped the hormones about 2 years ago. Sex drive is non existant and pt would like to see if there is any medications to help this. Health and mood are great.   She states she has tried lifestyle changes; dates, back rubs, romantic movies, writing letters. She states has no desire.          Patient Active Problem List   Diagnosis     CARDIOVASCULAR SCREENING; LDL GOAL LESS THAN 160     Other atopic dermatitis and related conditions     Pain in joint, pelvic region and thigh     Past Surgical History:   Procedure Laterality Date     COLONOSCOPY N/A 10/8/2019    Procedure: COLONOSCOPY;  Surgeon: Shahram Montoya DO;  Location: [facility]     HYSTERECTOMY, PAP NO LONGER INDICATED       TUBAL/ECTOPIC PREGNANCY  1998    Ectopic     TUBAL/ECTOPIC PREGNANCY  2009    Tube Remove       Social History     Tobacco Use     Smoking status: Current Every Day Smoker     Smokeless tobacco: Never Used   Substance Use Topics     Alcohol use: Yes     Comment: rare- 3 per week     Family History   Problem Relation Age of Onset     Lung Cancer Mother 68        mets every where by the time they found it     Cerebrovascular Disease Mother 68     Lung Cancer Father         agent orange     Colon Cancer Maternal Grandmother 50     Lung Cancer Maternal Grandmother          Current Outpatient Medications   Medication Sig Dispense Refill     buPROPion (WELLBUTRIN) 100 MG tablet Take 1 tablet (100 mg) by mouth daily Taper up every 5-7 days to dose of 300 mg in am 60 tablet 1     clotrimazole-betamethasone (LOTRISONE) 1-0.05 % external cream Apply topically 2 times daily (Patient not taking: Reported on 1/29/2020) 45 g 1     nystatin-triamcinolone (MYCOLOG) 549865-0.1 UNIT/GM-% external ointment Apply topically 2 times daily (Patient not taking:  "Reported on 1/29/2020) 30 g 1     venlafaxine (EFFEXOR-XR) 37.5 MG 24 hr capsule Take 1 capsule (37.5 mg) by mouth daily (Patient not taking: Reported on 1/29/2020) 90 capsule 0     No Known Allergies  Recent Labs   Lab Test 09/16/19  1706   LDL 76   HDL 70   TRIG 58   ALT 24   CR 0.69   GFRESTIMATED >90   GFRESTBLACK >90   POTASSIUM 3.7      BP Readings from Last 3 Encounters:   01/29/20 116/68   10/08/19 93/60   09/16/19 104/72    Wt Readings from Last 3 Encounters:   01/29/20 68.5 kg (151 lb)   09/16/19 68.9 kg (152 lb)   08/15/13 69.9 kg (154 lb)        Reviewed and updated as needed this visit by Provider      Review of Systems   ROS COMP: Constitutional, HEENT, cardiovascular, pulmonary, GI, , musculoskeletal, neuro, skin, endocrine and psych systems are negative, except as otherwise noted.      Objective    /68   Pulse 94   Temp 97.9  F (36.6  C) (Temporal)   Resp 16   Ht 1.74 m (5' 8.5\")   Wt 68.5 kg (151 lb)   LMP 07/02/2013   SpO2 99%   BMI 22.63 kg/m    Body mass index is 22.63 kg/m .  Physical Exam   GENERAL: healthy, alert and no distress  NECK: no adenopathy, no asymmetry, masses, or scars and thyroid normal to palpation  RESP: lungs clear to auscultation - no rales, rhonchi or wheezes  CV: regular rate and rhythm, normal S1 S2, no S3 or S4, no murmur, click or rub, no peripheral edema and peripheral pulses strong  PSYCH: mentation appears normal, affect normal/bright      Assessment & Plan     1. Low libido  Home care instructions were reviewed with the patient. The risks, benefits and treatment options of prescribed medications or other treatments have been discussed with the patient. The patient verbalized their understanding and should call or follow up if no improvement or if they develop further problems.  Will return to clinic in 5 weeks this was scheduled.   - buPROPion (WELLBUTRIN) 100 MG tablet; Take 1 tablet (100 mg) by mouth daily Taper up every 5-7 days to dose of 300 mg in " am  Dispense: 60 tablet; Refill: 1       ELIZA [facility]

## 2020-01-29 ENCOUNTER — OFFICE VISIT (OUTPATIENT)
Dept: FAMILY MEDICINE | Facility: OTHER | Age: 40
End: 2020-01-29
Payer: COMMERCIAL

## 2020-01-29 VITALS
OXYGEN SATURATION: 99 % | HEART RATE: 94 BPM | BODY MASS INDEX: 22.36 KG/M2 | HEIGHT: 69 IN | TEMPERATURE: 97.9 F | SYSTOLIC BLOOD PRESSURE: 116 MMHG | WEIGHT: 151 LBS | RESPIRATION RATE: 16 BRPM | DIASTOLIC BLOOD PRESSURE: 68 MMHG

## 2020-01-29 DIAGNOSIS — R68.82 LOW LIBIDO: Primary | ICD-10-CM

## 2020-01-29 PROBLEM — L20.89 OTHER ATOPIC DERMATITIS AND RELATED CONDITIONS: Status: ACTIVE | Noted: 2020-01-29

## 2020-01-29 PROCEDURE — 99213 OFFICE O/P EST LOW 20 MIN: CPT | Performed by: NURSE PRACTITIONER

## 2020-01-29 RX ORDER — BUPROPION HYDROCHLORIDE 100 MG/1
100 TABLET ORAL DAILY
Qty: 60 TABLET | Refills: 1 | Status: SHIPPED | OUTPATIENT
Start: 2020-01-29 | End: 2020-03-11

## 2020-01-29 ASSESSMENT — MIFFLIN-ST. JEOR: SCORE: 1406.37

## 2020-03-06 NOTE — PROGRESS NOTES
Subjective     Esperanza Velarde is a 39 year old female who presents to clinic today for the following health issues:  Answers for HPI/ROS submitted by the patient on 3/11/2020   Chronic problems general questions HPI Form  If you checked off any problems, how difficult have these problems made it for you to do your work, take care of things at home, or get along with other people?: Not difficult at all  PHQ9 TOTAL SCORE: 0  KRYSTINA 7 TOTAL SCORE: 0    History of Present Illness        She eats 2-3 servings of fruits and vegetables daily.She consumes 0 sweetened beverage(s) daily.She exercises with enough effort to increase her heart rate 10 to 19 minutes per day.  She exercises with enough effort to increase her heart rate 3 or less days per week.   She is taking medications regularly.     Concern - Medication check     Description:   Medication has helped sex drive and smoking cessation. Would like to continue       Patient Active Problem List   Diagnosis     CARDIOVASCULAR SCREENING; LDL GOAL LESS THAN 160     Other atopic dermatitis and related conditions     Pain in joint, pelvic region and thigh     Low libido     Past Surgical History:   Procedure Laterality Date     COLONOSCOPY N/A 10/8/2019    Procedure: COLONOSCOPY;  Surgeon: Shahram Montoya DO;  Location: [facility]     HYSTERECTOMY, PAP NO LONGER INDICATED       TUBAL/ECTOPIC PREGNANCY  1998    Ectopic     TUBAL/ECTOPIC PREGNANCY  2009    Tube Remove       Social History     Tobacco Use     Smoking status: Current Every Day Smoker     Smokeless tobacco: Never Used   Substance Use Topics     Alcohol use: Yes     Comment: rare- 3 per week     Family History   Problem Relation Age of Onset     Lung Cancer Mother 68        mets every where by the time they found it     Cerebrovascular Disease Mother 68     Lung Cancer Father         agent orange     Colon Cancer Maternal Grandmother 50     Lung Cancer Maternal Grandmother          Current Outpatient  Medications   Medication Sig Dispense Refill     buPROPion (WELLBUTRIN) 100 MG tablet Take 1 tablet (100 mg) by mouth daily Taper up every 5-7 days to dose of 300 mg in am 90 tablet 3     No Known Allergies  Recent Labs   Lab Test 09/16/19  1706   LDL 76   HDL 70   TRIG 58   ALT 24   CR 0.69   GFRESTIMATED >90   GFRESTBLACK >90   POTASSIUM 3.7      BP Readings from Last 3 Encounters:   03/11/20 112/68   01/29/20 116/68   10/08/19 93/60    Wt Readings from Last 3 Encounters:   03/11/20 68.9 kg (152 lb)   01/29/20 68.5 kg (151 lb)   09/16/19 68.9 kg (152 lb)         Reviewed and updated as needed this visit by Provider  Allergies  Meds  Problems  Med Hx  Surg Hx         Review of Systems   ROS COMP: Constitutional, HEENT, cardiovascular, pulmonary, GI, , musculoskeletal, neuro, skin, endocrine and psych systems are negative, except as otherwise noted.      Objective    /68   Pulse 72   Temp 98  F (36.7  C) (Temporal)   Resp 16   Wt 68.9 kg (152 lb)   LMP 07/02/2013   SpO2 100%   BMI 22.78 kg/m    Body mass index is 22.78 kg/m .  Physical Exam   GENERAL: healthy, alert and no distress  NECK: no adenopathy, no asymmetry, masses, or scars and thyroid normal to palpation  RESP: lungs clear to auscultation - no rales, rhonchi or wheezes  CV: regular rate and rhythm, normal S1 S2, no S3 or S4, no murmur, click or rub, no peripheral edema and peripheral pulses strong  ABDOMEN: soft, nontender, no hepatosplenomegaly, no masses and bowel sounds normal  PSYCH: mentation appears normal, affect normal/bright        Assessment & Plan     1. Low libido  Doing well denies s/e refill given.   - buPROPion (WELLBUTRIN) 100 MG tablet; Take 1 tablet (100 mg) by mouth daily Taper up every 5-7 days to dose of 300 mg in am  Dispense: 90 tablet; Refill: 3         ELIZA Fuller [facility]

## 2020-03-11 ENCOUNTER — OFFICE VISIT (OUTPATIENT)
Dept: FAMILY MEDICINE | Facility: OTHER | Age: 40
End: 2020-03-11
Payer: COMMERCIAL

## 2020-03-11 VITALS
RESPIRATION RATE: 16 BRPM | HEART RATE: 72 BPM | DIASTOLIC BLOOD PRESSURE: 68 MMHG | SYSTOLIC BLOOD PRESSURE: 112 MMHG | BODY MASS INDEX: 22.78 KG/M2 | WEIGHT: 152 LBS | TEMPERATURE: 98 F | OXYGEN SATURATION: 100 %

## 2020-03-11 DIAGNOSIS — R68.82 LOW LIBIDO: ICD-10-CM

## 2020-03-11 PROCEDURE — 99213 OFFICE O/P EST LOW 20 MIN: CPT | Performed by: NURSE PRACTITIONER

## 2020-03-11 RX ORDER — BUPROPION HYDROCHLORIDE 100 MG/1
100 TABLET ORAL DAILY
Qty: 90 TABLET | Refills: 3 | Status: SHIPPED | OUTPATIENT
Start: 2020-03-11 | End: 2022-04-27

## 2020-03-11 ASSESSMENT — ANXIETY QUESTIONNAIRES
GAD7 TOTAL SCORE: 0
1. FEELING NERVOUS, ANXIOUS, OR ON EDGE: NOT AT ALL
GAD7 TOTAL SCORE: 0
6. BECOMING EASILY ANNOYED OR IRRITABLE: NOT AT ALL
5. BEING SO RESTLESS THAT IT IS HARD TO SIT STILL: NOT AT ALL
3. WORRYING TOO MUCH ABOUT DIFFERENT THINGS: NOT AT ALL
2. NOT BEING ABLE TO STOP OR CONTROL WORRYING: NOT AT ALL
7. FEELING AFRAID AS IF SOMETHING AWFUL MIGHT HAPPEN: NOT AT ALL
7. FEELING AFRAID AS IF SOMETHING AWFUL MIGHT HAPPEN: NOT AT ALL
4. TROUBLE RELAXING: NOT AT ALL
GAD7 TOTAL SCORE: 0

## 2020-03-11 ASSESSMENT — PATIENT HEALTH QUESTIONNAIRE - PHQ9
SUM OF ALL RESPONSES TO PHQ QUESTIONS 1-9: 0
10. IF YOU CHECKED OFF ANY PROBLEMS, HOW DIFFICULT HAVE THESE PROBLEMS MADE IT FOR YOU TO DO YOUR WORK, TAKE CARE OF THINGS AT HOME, OR GET ALONG WITH OTHER PEOPLE: NOT DIFFICULT AT ALL
SUM OF ALL RESPONSES TO PHQ QUESTIONS 1-9: 0

## 2020-03-12 ASSESSMENT — ANXIETY QUESTIONNAIRES: GAD7 TOTAL SCORE: 0

## 2020-03-12 ASSESSMENT — PATIENT HEALTH QUESTIONNAIRE - PHQ9: SUM OF ALL RESPONSES TO PHQ QUESTIONS 1-9: 0

## 2020-05-05 ENCOUNTER — HOSPITAL ENCOUNTER (OUTPATIENT)
Dept: MAMMOGRAPHY | Facility: CLINIC | Age: 40
Discharge: HOME | End: 2020-05-05
Attending: PHYSICIAN ASSISTANT | Admitting: PHYSICIAN ASSISTANT
Payer: COMMERCIAL

## 2020-05-05 DIAGNOSIS — R92.8 ABNORMAL MAMMOGRAM: ICD-10-CM

## 2020-05-05 PROCEDURE — G0279 TOMOSYNTHESIS, MAMMO: HCPCS

## 2020-05-05 NOTE — LETTER
Esperanza Velarde  [address] [address] [place] [id number]    May 5, 2020  Date of Exam: 5/5/20      Dear Esperanza Velarde:    Thank you for your recent visit.    Breast Imaging Result: Your breast imaging examination showed an area that we believe is benign (not cancer). We recommend that you come back again prior to your next yearly mammogram for short term follow-up, or in certain instances, biopsy. If you have not already scheduled this follow-up exam, please call [phone].     Breast Density: Your mammogram shows that you have dense breast tissue. This means you have a slightly higher risk of getting breast cancer. It also means your mammograms will be harder to read, but it doesn't mean that mammograms aren t useful. In fact, yearly mammograms are even more important for women at higher risk.    As you know, early detection of cancer is very important. Although mammography is the most accurate method for early detection, not all cancers are found through mammography. A thorough examination includes a combination of mammography and a physical examination by your provider. Therefore, if you have not had a recent physical exam of your breasts see your health care provider.    A report of your breast imaging results was sent to: Malu Rae    Your breast imaging will become part of your medical file here at [facility] for at least 10 years. You are responsible for informing any new health care provider or mammography facility of the date and location of this examination.    We appreciate the opportunity to participate in your health care.    Sincerely,    Dr. Alex  Interpreting Radiologist  [facility]

## 2020-05-05 NOTE — RESULT ENCOUNTER NOTE
Please notify patient overall her mammogram findings were stable however a follow-up in 6 months is recommended to recheck increased density in the right breast.     Malu Rae PA-C

## 2020-05-13 ENCOUNTER — NURSE TRIAGE (OUTPATIENT)
Dept: NURSING | Facility: CLINIC | Age: 40
End: 2020-05-13

## 2020-05-13 NOTE — TELEPHONE ENCOUNTER
"    Additional Information    Negative: Ear pain is the main symptom    Negative: Hearing loss (complete or partial) is the main symptom    Negative: Earwax is the main concern    Negative: Has nasal allergies and they are acting up    Negative: Earache lasts > 1 hour    Negative: Pus or cloudy discharge from ear canal    Negative: Patient wants to be seen    Negative: Ear congestion present > 48 hours    Ear congestion    Answer Assessment - Initial Assessment Questions  1. LOCATION: \"Which ear is involved?\"        both  2. SENSATION: \"Describe how the ear feels.\"       Feels pressure in ears and throat  3. ONSET:  \"When did the ear symptoms start?\"        2 days  4. PAIN: \"Do you also have an earache?\" If so, ask: \"How bad is it?\" (Scale 1-10; or mild, moderate, severe)      mild  5. CAUSE: \"What do you think is causing the ear congestion?\"      ?sinus starting, ?allergies  6. URI: \"Do you have a runny nose or cough?\"       no  7. NASAL ALLERGIES: \"Are there symptoms of hay fever, such as sneezing or a clear nasal discharge?\"      ?  8. PREGNANCY: \"Is there any chance you are pregnant?\" \"When was your last menstrual period?\"      no    Protocols used: EAR - CONGESTION-A-OH      "

## 2020-10-22 ENCOUNTER — OFFICE VISIT (OUTPATIENT)
Dept: FAMILY MEDICINE | Facility: OTHER | Age: 40
End: 2020-10-22
Payer: COMMERCIAL

## 2020-10-22 ENCOUNTER — ANCILLARY PROCEDURE (OUTPATIENT)
Dept: GENERAL RADIOLOGY | Facility: OTHER | Age: 40
End: 2020-10-22
Attending: NURSE PRACTITIONER
Payer: COMMERCIAL

## 2020-10-22 VITALS
DIASTOLIC BLOOD PRESSURE: 82 MMHG | BODY MASS INDEX: 24.83 KG/M2 | RESPIRATION RATE: 16 BRPM | OXYGEN SATURATION: 98 % | TEMPERATURE: 97.4 F | WEIGHT: 165.7 LBS | HEART RATE: 95 BPM | SYSTOLIC BLOOD PRESSURE: 110 MMHG

## 2020-10-22 DIAGNOSIS — M25.512 ACUTE PAIN OF LEFT SHOULDER: Primary | ICD-10-CM

## 2020-10-22 DIAGNOSIS — M25.512 ACUTE PAIN OF LEFT SHOULDER: ICD-10-CM

## 2020-10-22 PROCEDURE — 73030 X-RAY EXAM OF SHOULDER: CPT | Mod: LT | Performed by: RADIOLOGY

## 2020-10-22 PROCEDURE — 99214 OFFICE O/P EST MOD 30 MIN: CPT | Performed by: NURSE PRACTITIONER

## 2020-10-22 RX ORDER — METHYLPREDNISOLONE 4 MG
TABLET, DOSE PACK ORAL
Qty: 21 TABLET | Refills: 0 | Status: SHIPPED | OUTPATIENT
Start: 2020-10-22 | End: 2022-04-27

## 2020-10-22 NOTE — PATIENT INSTRUCTIONS
Patient Education     Exercises for Shoulder Flexibility: External Rotation    This stretch can help restore shoulder flexibility and relieve pain over time. When stretching, be sure to breathe deeply. Follow any special instructions from your healthcare provider or physical therapist:  1.  a doorway. Grasp the doorjamb with the hand on the frozen side. Your arm should be bent.  2. With the other hand, hold the elbow on the side with the involved frozen (stiff) shoulder firmly against your body.  3. Standing in the same spot, rotate your body away from the doorjamb. Stop when you feel the stretch in the shoulder. At first, try to hold the stretch for 5 seconds.  4. Work up to doing 3 sets of this stretch, 3 times a day. Work up to holding the stretch for 30 to 60 seconds.  Note: Keep your arms as still as you can. Over time, rotate your body a little more to enhance the stretch. But be careful not to twist your back.  Frozen shoulder is another name for adhesive capsulitis, which causes restricted movement in the shoulder. If you have frozen shoulder, this stretch may cause discomfort, especially when you first get started. A few months may pass before you achieve the results you want. But once your shoulder heals, it rarely becomes frozen again. So stick to your stretching program. If you have any questions, be sure to ask your healthcare provider.  Date Last Reviewed: 3/1/2018    6477-3659 [employer]. [address] [place] [address]. All rights reserved. This information is not intended as a substitute for professional medical care. Always follow your healthcare professional's instructions.           Patient Education     Exercises for Shoulder Flexibility: Internal Rotation    This stretch can help restore shoulder flexibility and relieve pain over time. When stretching, be sure to breathe deeply. Follow any special instructions from your healthcare provider or physical  therapist.  1. While seated, move the arm on the side you want to stretch toward the middle of your back. The palm of your hand should face out.  2. Cup your other hand under the hand that s behind your back. Gently push your cupped hand upward until you feel the stretch in the shoulder. Try to hold the stretch for 5 seconds.  3. Work up to doing 3 sets of this stretch, 3 times a day. Work up to holding the stretch for 30 to 60 seconds.  Note: Keep your back straight. It s OK if your hand can t reach the middle of your back. Instead, start the stretch with your hand as close as you can get it to the middle of your back.  Frozen shoulder  Frozen shoulder is another name for adhesive capsulitis. This causes restricted movement in the shoulder. If you have frozen shoulder, this stretch may cause discomfort, especially when you first get started. A few months may pass before you achieve the results you want. But once your shoulder heals, it rarely becomes frozen again. So stick to your stretching program. If you have any questions, be sure to ask your healthcare provider.   Date Last Reviewed: 12/1/2017 2000-2019 [employer]. [address] [place] [address]. All rights reserved. This information is not intended as a substitute for professional medical care. Always follow your healthcare professional's instructions.           Patient Education     Exercises for Shoulder Flexibility: Adduction (Reaching Across)    This stretch can help restore shoulder flexibility and relieve pain over time. When stretching, be sure to breathe deeply. And follow any special instructions from your doctor or physical therapist:  1. Put the hand from the side you want to stretch on your opposite shoulder. Your elbow should point away from your body. Try to raise your elbow as close to shoulder height as you can.  2. With your other hand, push the raised elbow toward the opposite shoulder. Avoid turning your head.  Stop when you feel the stretch. Try to hold the stretch for 5 seconds.  3. Work up to doing 3 sets of this stretch, 3 times a day. Work up to holding the stretch for 30 to 60 seconds.  Note: Be sure to push your elbow across your chest, not up toward your chin. Over time, try to push your elbow farther across your chest to enhance the stretch.  Frozen shoulder is another name for adhesive capsulitis, which causes restricted movement in the shoulder. If you have frozen shoulder, this stretch may cause discomfort, especially when you first get started. A few months may pass before you achieve the results you want. Once your shoulder heals, it rarely becomes frozen again. So stick to your stretching program. If you have any questions, be sure to ask your doctor.  Date Last Reviewed: 3/1/2018    2332-3014 [employer]. [address] [place] [address]. All rights reserved. This information is not intended as a substitute for professional medical care. Always follow your healthcare professional's instructions.           Patient Education     Exercises for Shoulder Flexibility: Back Scratch    Improving your flexibility can reduce pain. Stretching exercises also can help increase your range of pain-free motion. Breathe normally when you exercise. Try to use smooth, fluid movements. Never force a stretch.  Note: Follow any special instructions you are given. If you feel pain, stop the exercise. If the pain continues after stopping, call your healthcare provider.    Stand straight, placing the back of your hand on the side you want to stretch flat against your lower back.    Throw one end of a towel over your shoulder. Grab it behind your back with your other hand.    Pull down gently on the towel with your front arm. Let your back arm slide up as high as is comfortable. You ll feel a stretch in your shoulder. Hold the stretch for a few seconds.    Repeat 3 to 5 times. Build up to holding each stretch  for 30 to 60 seconds.  For your safety, check with your healthcare provider before starting an exercise program.  Date Last Reviewed: 3/1/2018    6893-7657 [employer]. [address] [place] [address]. All rights reserved. This information is not intended as a substitute for professional medical care. Always follow your healthcare professional's instructions.           Patient Education     Exercises for Shoulder Flexibility: Wall Walk    Improving your flexibility can reduce pain. Stretching exercises also can help increase your range of pain-free motion. Breathe normally when you exercise. Use smooth, fluid movements.  Note: Follow any special instructions you are given. If you feel pain, stop the exercise. If the pain continues after stopping, call your healthcare provider:    Stand with your shoulder about 2 feet from the wall.    Raise your arm to shoulder level and gently  walk  your fingers up the wall as high as you can.    Hold for a few seconds. Then walk your fingers back down.    Repeat 3 times. Move closer to the wall as you repeat.    Build up to holding each stretch for 30 seconds.  Caution: Do this stretch only if your healthcare provider recommends it. Don t do it when you are first injured.  Date Last Reviewed: 3/1/2018    5873-2519 [employer]. [address] [place] [address]. All rights reserved. This information is not intended as a substitute for professional medical care. Always follow your healthcare professional's instructions.           Patient Education     Shoulder Exercises    To start, sit in a chair with your feet flat on the floor. Your weight should be slightly forward so that you re balanced evenly on your buttocks. Relax your shoulders and keep your head level. Avoid arching your back or rounding your shoulders. Using a chair with arms may help you keep your balance.    Raise your arms, elbows bent, to shoulder height.    Slowly move  your forearms together. Hold for 5 seconds.    Return to starting position. Repeat 5 times.  Date Last Reviewed: 3/1/2018    [id number] [employer]. [address] [place] [address]. All rights reserved. This information is not intended as a substitute for professional medical care. Always follow your healthcare professional's instructions.           Patient Education     Shoulder Exercises: Biceps Curl    This exercise stretches and strengthens your shoulders and arms. Before starting, read through all the instructions. During the exercise, breathe normally and use smooth movements. Stop if you feel any pain. If pain persists, call your healthcare provider.  Here are the steps for the biceps curl:     Hold a ____ pound weight in each hand, with your palms facing your body. Tuck your arms close to your sides. Ask your physical therapist how much weight to use.     Bend your left elbow and raise the weight to your left shoulder. As you lower that weight, bend your right elbow and raise the weight to your right shoulder. Continue to alternate arms.    Repeat ____ times. Do ____ sets ____ times a day.     CAUTION: Keep your arms close to your body throughout the exercise. Keep your wrists straight.   Date Last Reviewed: 11/1/2017 2000-2019 [employer]. [address] [place] [address]. All rights reserved. This information is not intended as a substitute for professional medical care. Always follow your healthcare professional's instructions.           Patient Education     Shoulder Exercises: Shoulder Press    This exercise stretches and strengthens your shoulders. Before starting, read through all the instructions. During the exercise, breathe normally and use smooth movements. Stop if you feel any pain. If pain persists, call your healthcare provider.    Hold a ____ pound weight in each hand, elbows at shoulder level, palms facing forward. Arms to the side and slightly  forward. Ask your physical therapist how much weight to use.     Raise one arm up until it s almost straight. Hold for a second. Lower the weight, extending the other arm up.    Repeat ____ times with each arm. Do ____ sets ____ times a day.  CAUTION: If you have shoulder problems, consult your healthcare provider before doing this exercise. Keep your head and body still during the exercise. Only your arms should move.   Date Last Reviewed: 11/1/2017 2000-2019 [employer]. [address] [place] [address]. All rights reserved. This information is not intended as a substitute for professional medical care. Always follow your healthcare professional's instructions.           Patient Education     Shoulder Exercises: Side Raise    This exercise stretches and strengthens your shoulders. Before starting, read through all the instructions. During the exercise, breathe normally and use smooth movements. Stop if you feel any pain. If pain persists, call your healthcare provider.    Stand straight, holding a ____ pound weight in each hand, arms at sides, feet shoulder-width apart. Ask your physical therapist or healthcare provider how much weight to use.     Slowly extend your arms up and out until weights are at shoulder level. Slowly return to starting position.    Repeat ____ times. Do ____ sets ____ times a day.     CAUTION: Don t swing the weights or raise weights above shoulder level.   Date Last Reviewed: 11/1/2017 2000-2019 [employer]. [address] [place] [address]. All rights reserved. This information is not intended as a substitute for professional medical care. Always follow your healthcare professional's instructions.           Patient Education     Shoulder Exercises: Triceps Press    This exercise stretches and strengthens your shoulders. Before starting, read through all the instructions. During the exercise, breathe normally and use smooth movements.  Stop if you feel any pain. If pain persists, call your healthcare provider.    Grasp a ___ pound  weight in each hand. Raise one arm overhead. Hold that arm close to your ear. Bend your elbow and lower the weight behind your head, as far as you can, being careful not to hit your head. Ask your physical therapist or healthcare provider how much weight to use.     Slowly straighten your elbow, extending your arm upward. Return to starting position.    Repeat ____ times with each arm. Do ____ sets ____ times a day.  CAUTION: Keep your head still and neck straight. Keep your arm close to your ear. Don t arch your back.   Date Last Reviewed: 11/1/2017 2000-2019 [employer]. [address] [place] [address], [place] [address]. All rights reserved. This information is not intended as a substitute for professional medical care. Always follow your healthcare professional's instructions.

## 2020-10-22 NOTE — PROGRESS NOTES
Subjective     Esperanza Velarde is a 40 year old female who presents to clinic today for the following health issues:    HPI         Musculoskeletal problem/pain  Onset/Duration: 1 week ago  Description  Location: shoulder - left  Joint Swelling: no  Redness: no  Pain: YES  Warmth: no  Intensity:  6/10  Progression of Symptoms:  worsening  Accompanying signs and symptoms:   Fevers: no95   Numbness/tingling/weakness: YES- left arm  History  Trauma to the area: no  Recent illness:  no  Previous similar problem: YES- a year ago but pain went away   Previous evaluation:  no  Precipitating or alleviating factors:  Aggravating factors include: sitting, standing, walking, climbing stairs and overuse  Therapies tried and outcome: rest/inactivity, heat, ice and acetaminophen- sometimes the tylenol will help    Has been in pain for years.  Hard to lay down.  Hurts to lay down or sit.  Easily happens a few times a year.  Will usually aches.  Now worsening and sharp pain with certain movement.  Tylenol helps her usually for pain.  Ibuprofen does not . Ice and heat does not help.   Review of Systems   Constitutional, HEENT, cardiovascular, pulmonary, gi and gu systems are negative, except as otherwise noted.      Objective    /82   Pulse 95   Temp 97.4  F (36.3  C) (Temporal)   Resp 16   Wt 75.2 kg (165 lb 11.2 oz)   LMP 07/02/2013   SpO2 98%   BMI 24.83 kg/m    Body mass index is 24.83 kg/m .  Physical Exam   GENERAL: healthy, alert and no distress  NECK: no adenopathy, no asymmetry, masses, or scars and thyroid normal to palpation  RESP: lungs clear to auscultation - no rales, rhonchi or wheezes  CV: regular rate and rhythm, normal S1 S2, no S3 or S4, no murmur, click or rub, no peripheral edema and peripheral pulses strong  ABDOMEN: soft, nontender, no hepatosplenomegaly, no masses and bowel sounds normal  MS: no gross musculoskeletal defects noted, no edema  Exam of the shoulder reveals no obvious deformity,  warmth, erythema.  Strength is limited throughout due to level of pain. Tender to subacromial bursa and surrounding tissue.    Sensation intact to light touch.  Painful arc stops at 90, empty can test positive, external rotation and internal rotation full but painful, apley scratch un able to do due to pain,   Unable to fully walk arm up doorway        Xray - negative to my read.          Assessment & Plan     Acute pain of left shoulder  Suspect subacromial bursitis with some deltoid involvement.  Will trial medrol dose pack, tylenol, heat/ ice, gentle stretching. If no improvement would have her see ortho or sports medicine.   - methylPREDNISolone (MEDROL DOSEPAK) 4 MG tablet therapy pack; Follow Package Directions  - XR Shoulder Left 2 Views; Future     Tobacco Cessation:   reports that she has been smoking cigarettes. She has been smoking about 0.50 packs per day. She has never used smokeless tobacco.  Tobacco Cessation Action Plan: Information offered: Patient not interested at this time             Return in about 4 weeks (around 11/19/2020) for recheck with PCP.    Arlin Galvan NP  [facility]

## 2020-10-23 ENCOUNTER — TELEPHONE (OUTPATIENT)
Dept: FAMILY MEDICINE | Facility: OTHER | Age: 40
End: 2020-10-23

## 2020-10-23 NOTE — TELEPHONE ENCOUNTER
----- Message from Arlin Galvan NP sent at 10/23/2020 10:29 AM CDT -----  Please call patient and let her know her xray of her shoulder was read as negative by the radiologist as well.  Arlin Galvan CNP

## 2020-10-23 NOTE — TELEPHONE ENCOUNTER
Called patient advised message below. Will call if anything changes or doesn't get relief from plan of care with provider.    Chris Logan MA on 10/23/2020 at 1:34 PM

## 2020-10-27 ENCOUNTER — MYC MEDICAL ADVICE (OUTPATIENT)
Dept: FAMILY MEDICINE | Facility: OTHER | Age: 40
End: 2020-10-27

## 2020-10-27 DIAGNOSIS — R92.8 ABNORMAL MAMMOGRAM: Primary | ICD-10-CM

## 2020-11-02 ENCOUNTER — TELEPHONE (OUTPATIENT)
Dept: FAMILY MEDICINE | Facility: OTHER | Age: 40
End: 2020-11-02

## 2020-11-02 DIAGNOSIS — R92.8 ABNORMAL MAMMOGRAM: Primary | ICD-10-CM

## 2020-11-02 NOTE — TELEPHONE ENCOUNTER
[facility] calling stating they need to cancel patients appointment for Friday due to no Breast Rad available, also the order needs to be changed to Diagnostic Bilateral Mammo [id number]    RECOMMENDED FOLLOW-UP: 6 month follow-up diagnostic bilateral  mammography to include additional diagnostic evaluation of the right  breast parenchyma and annual screening of the left breast    Please sign order if appropriate    Once order signed, please call [facility] to let them know, then they can reschedule patient    Thanks  Mary

## 2020-11-03 NOTE — TELEPHONE ENCOUNTER
Spoke with [facility], they will let the patient know and reschedule   Closing encounter  Mary Rebolledo RT (R)

## 2020-12-07 ENCOUNTER — HOSPITAL ENCOUNTER (OUTPATIENT)
Dept: MAMMOGRAPHY | Facility: CLINIC | Age: 40
Discharge: HOME | End: 2020-12-07
Attending: NURSE PRACTITIONER | Admitting: NURSE PRACTITIONER
Payer: COMMERCIAL

## 2020-12-07 DIAGNOSIS — R92.8 ABNORMAL MAMMOGRAM: ICD-10-CM

## 2020-12-07 PROCEDURE — 77067 SCR MAMMO BI INCL CAD: CPT

## 2020-12-11 NOTE — RESULT ENCOUNTER NOTE
Results discussed with patient in clinic. States understanding of these results.    Kristin Goss CNP

## 2020-12-12 ENCOUNTER — HEALTH MAINTENANCE LETTER (OUTPATIENT)
Age: 40
End: 2020-12-12

## 2020-12-15 ENCOUNTER — MYC MEDICAL ADVICE (OUTPATIENT)
Dept: FAMILY MEDICINE | Facility: CLINIC | Age: 40
End: 2020-12-15

## 2020-12-15 ENCOUNTER — HOSPITAL ENCOUNTER (OUTPATIENT)
Dept: ULTRASOUND IMAGING | Facility: CLINIC | Age: 40
End: 2020-12-15
Attending: NURSE PRACTITIONER
Payer: COMMERCIAL

## 2020-12-15 ENCOUNTER — HOSPITAL ENCOUNTER (OUTPATIENT)
Dept: MAMMOGRAPHY | Facility: CLINIC | Age: 40
End: 2020-12-15
Attending: NURSE PRACTITIONER
Payer: COMMERCIAL

## 2020-12-15 DIAGNOSIS — R92.8 ABNORMAL MAMMOGRAM: ICD-10-CM

## 2020-12-15 PROCEDURE — G0279 TOMOSYNTHESIS, MAMMO: HCPCS

## 2020-12-15 PROCEDURE — 76642 ULTRASOUND BREAST LIMITED: CPT | Mod: RT

## 2020-12-15 NOTE — RESULT ENCOUNTER NOTE
Please advise Esperanza Velarde,  1980, that her US results were normal US per radiology read.  [phone] (home)   Thank you  Kristin Goss CNP

## 2020-12-15 NOTE — TELEPHONE ENCOUNTER
Please advise Esperanza Velarde,  1980, that her US results were normal US per radiology read.   [phone] (home)   Thank you   Kristin Goss CNP

## 2020-12-15 NOTE — LETTER
December 15, 2020      Esperanza Velarde  [address] [address] [place] [id number]        Dear ,    We are writing to inform you of your test results.    ***    Resulted Orders   US Breast Right    Narrative    [facility] DIAGNOSTIC RIGHT W CROW, US BREAST RIGHT LIMITED 1-3 QUADRANTS -   12/15/2020 12:12 PM    Clinical History: Callback.    Comparison:  Multiple, most recent 12/7/2020    Breast Density: Heterogeneously dense, which may obscure small masses.    Right Digital Diagnostic Mammogram    Findings: A diagnostic right mammogram was performed utilizing  tomosynthesis. Abnormality identified on screening mammogram 12/7/2020  is consistent with summation artifact.    When performed, computer-aided detection was used in the  interpretation of this study.      Right Breast Ultrasound    Findings: Ultrasound evaluation of the right breast was performed. 2  mm cyst present left breast 3:00 1 cm from nipple, incidental  observation for which no follow-up is needed.      Impression    Impression: No evidence of malignancy.      Recommendation:  Resume annual screening mammography.  The patient was  provided with the results and recommendations at the completion of the  examination.     ACR 1: Negative     The patient will receive a lay language report of this examination.      FERNANDA AGRAWAL MD       If you have any questions or concerns, please call the clinic at the number listed above.       Sincerely,      Kristin ELIZA Cueva CNP

## 2021-06-05 ENCOUNTER — NURSE TRIAGE (OUTPATIENT)
Dept: NURSING | Facility: CLINIC | Age: 41
End: 2021-06-05

## 2021-06-05 NOTE — TELEPHONE ENCOUNTER
Jammed left great toe on 6/3.      Unable to bend toe    Nail bed has turned black    Entire toe is bruised    Swollen    Walks with a limp    Patient is wondering if she may have broken her toe.  Has been using ice.  Urgent care closest to her home is in [place].      Paz Staley RN MAN   Triage Nurse Advisor [facility]    Reason for Disposition    [1] MODERATE pain (e.g., interferes with normal activities, limping) AND [2] present > 3 days    Additional Information    Negative: Foot is cool or blue in comparison to other foot    Negative: Purple or black skin on toe  (Exception: simple recalled injury with bruise)    Negative: [1] Looks infected (e.g., spreading redness, red streak, pus) AND [2] fever    Negative: Patient sounds very sick or weak to the triager    Negative: [1] SEVERE pain (e.g., excruciating, unable to do any normal activities) AND [2] not improved after 2 hours of pain medicine    Negative: [1] Redness spreading into foot or red streak into foot AND [2] no fever    Negative: Looks like a boil, infected sore, or deep ulcer    Negative: [1] Swollen toe AND [2] no fever  (Exceptions: just localized bump from bunion, corns, insect bite, sting)    Negative: Yellow pus seen in skin around toenail (cuticle area), or pus seen under toenail    Negative: Numbness in one foot (i.e., loss of sensation)    Protocols used: TOE PAIN-A-

## 2021-08-03 ENCOUNTER — MYC MEDICAL ADVICE (OUTPATIENT)
Dept: FAMILY MEDICINE | Facility: CLINIC | Age: 41
End: 2021-08-03

## 2021-09-26 ENCOUNTER — HEALTH MAINTENANCE LETTER (OUTPATIENT)
Age: 41
End: 2021-09-26

## 2021-12-17 ENCOUNTER — HOSPITAL ENCOUNTER (OUTPATIENT)
Dept: MAMMOGRAPHY | Facility: CLINIC | Age: 41
Discharge: HOME | End: 2021-12-17
Attending: NURSE PRACTITIONER | Admitting: NURSE PRACTITIONER
Payer: COMMERCIAL

## 2021-12-17 DIAGNOSIS — Z12.31 VISIT FOR SCREENING MAMMOGRAM: ICD-10-CM

## 2021-12-17 PROCEDURE — 77063 BREAST TOMOSYNTHESIS BI: CPT

## 2022-01-16 ENCOUNTER — HEALTH MAINTENANCE LETTER (OUTPATIENT)
Age: 42
End: 2022-01-16

## 2022-04-18 ENCOUNTER — OFFICE VISIT (OUTPATIENT)
Dept: FAMILY MEDICINE | Facility: CLINIC | Age: 42
End: 2022-04-18
Payer: COMMERCIAL

## 2022-04-18 VITALS
BODY MASS INDEX: 24.72 KG/M2 | DIASTOLIC BLOOD PRESSURE: 78 MMHG | WEIGHT: 165 LBS | SYSTOLIC BLOOD PRESSURE: 122 MMHG | OXYGEN SATURATION: 97 % | TEMPERATURE: 97.8 F | HEART RATE: 112 BPM

## 2022-04-18 DIAGNOSIS — M25.512 CHRONIC PAIN OF BOTH SHOULDERS: ICD-10-CM

## 2022-04-18 DIAGNOSIS — M25.511 CHRONIC PAIN OF BOTH SHOULDERS: ICD-10-CM

## 2022-04-18 DIAGNOSIS — L72.3 SEBACEOUS CYST: Primary | ICD-10-CM

## 2022-04-18 DIAGNOSIS — G89.29 CHRONIC PAIN OF BOTH SHOULDERS: ICD-10-CM

## 2022-04-18 PROCEDURE — 99213 OFFICE O/P EST LOW 20 MIN: CPT | Performed by: PHYSICIAN ASSISTANT

## 2022-04-18 RX ORDER — CEPHALEXIN 500 MG/1
500 CAPSULE ORAL 2 TIMES DAILY
Qty: 20 CAPSULE | Refills: 0 | Status: SHIPPED | OUTPATIENT
Start: 2022-04-18 | End: 2022-04-28

## 2022-04-18 ASSESSMENT — PAIN SCALES - GENERAL: PAINLEVEL: NO PAIN (0)

## 2022-04-18 NOTE — PROGRESS NOTES
Marii Mata is a 41 year old who presents for the following health issues     History of Present Illness       Reason for visit:  Lump,bruise,wound on bikini line  Symptoms include:  Pain, weeping  Symptom intensity:  Moderate  Symptom progression:  Staying the same    She eats 0-1 servings of fruits and vegetables daily.She consumes 0 sweetened beverage(s) daily.She exercises with enough effort to increase her heart rate 9 or less minutes per day.  She exercises with enough effort to increase her heart rate 3 or less days per week.   She is taking medications regularly.     Patient presents today for evaluation of a painful lump in her left groin fold. This has been present for the last couple weeks. She reports at times it will open and dry. Fluid is odorous. Very sore. Has tried warm compresses without much improvement.     Bilateral shoulder pain. This has been progressive for years. Now cannot life either arm over her head. Does not want surgery but would like to meet with ortho to discuss other options.     Review of Systems   Constitutional, HEENT, cardiovascular, pulmonary, gi and gu systems are negative, except as otherwise noted.      Objective    /78   Pulse 112   Temp 97.8  F (36.6  C) (Temporal)   Wt 74.8 kg (165 lb)   LMP 07/02/2013   SpO2 97%   BMI 24.72 kg/m    Body mass index is 24.72 kg/m .  Physical Exam   GENERAL: healthy, alert and no distress  MS: No obvious deformity of bilateral shoulders. No pain with palpation. Unable to raise arms above head due to pain.   SKIN: Inflamed sebaceous cyst in left groin fold - minimal fluctuance with surrounding induration. No spontaneous drainage at this time.   PSYCH: mentation appears normal, affect normal/bright      Assessment & Plan     Chronic pain of both shoulders  Referral to ortho for further evaluation and possible steroid injections.   - Orthopedic  Referral; Future    Sebaceous cyst  Discussed with patient that  symptoms are consistent with a sebaceous cyst. This has been spontaneously draining so I&D not required. Antibiotics started as below. Encouraged continuation of warm compresses. Patient will follow-up in clinic if new symptoms develop or current symptoms fail to improve.  - cephALEXin (KEFLEX) 500 MG capsule; Take 1 capsule (500 mg) by mouth 2 times daily for 10 days    The patient indicates understanding of these issues and agrees with the plan.    Malu Rae PA-C  [facility]

## 2022-04-27 ENCOUNTER — OFFICE VISIT (OUTPATIENT)
Dept: ORTHOPEDICS | Facility: CLINIC | Age: 42
End: 2022-04-27
Attending: PHYSICIAN ASSISTANT
Payer: COMMERCIAL

## 2022-04-27 ENCOUNTER — ANCILLARY PROCEDURE (OUTPATIENT)
Dept: GENERAL RADIOLOGY | Facility: CLINIC | Age: 42
End: 2022-04-27
Attending: PHYSICIAN ASSISTANT
Payer: COMMERCIAL

## 2022-04-27 VITALS
HEIGHT: 69 IN | BODY MASS INDEX: 24.44 KG/M2 | WEIGHT: 165 LBS | SYSTOLIC BLOOD PRESSURE: 104 MMHG | DIASTOLIC BLOOD PRESSURE: 70 MMHG

## 2022-04-27 DIAGNOSIS — G89.29 CHRONIC PAIN OF BOTH SHOULDERS: Primary | ICD-10-CM

## 2022-04-27 DIAGNOSIS — M25.511 RIGHT SHOULDER PAIN: ICD-10-CM

## 2022-04-27 DIAGNOSIS — M25.512 LEFT SHOULDER PAIN: ICD-10-CM

## 2022-04-27 DIAGNOSIS — M75.42 SUBACROMIAL IMPINGEMENT OF LEFT SHOULDER: ICD-10-CM

## 2022-04-27 DIAGNOSIS — M75.41 SUBACROMIAL IMPINGEMENT OF RIGHT SHOULDER: ICD-10-CM

## 2022-04-27 DIAGNOSIS — M75.82 TENDINITIS OF BOTH ROTATOR CUFFS: ICD-10-CM

## 2022-04-27 DIAGNOSIS — M25.511 CHRONIC PAIN OF BOTH SHOULDERS: Primary | ICD-10-CM

## 2022-04-27 DIAGNOSIS — M75.81 TENDINITIS OF BOTH ROTATOR CUFFS: ICD-10-CM

## 2022-04-27 DIAGNOSIS — M19.019 AC JOINT ARTHROPATHY: ICD-10-CM

## 2022-04-27 DIAGNOSIS — M25.512 CHRONIC PAIN OF BOTH SHOULDERS: Primary | ICD-10-CM

## 2022-04-27 PROCEDURE — 73030 X-RAY EXAM OF SHOULDER: CPT | Mod: TC | Performed by: RADIOLOGY

## 2022-04-27 PROCEDURE — 99204 OFFICE O/P NEW MOD 45 MIN: CPT | Performed by: PHYSICIAN ASSISTANT

## 2022-04-27 ASSESSMENT — PAIN SCALES - GENERAL: PAINLEVEL: MODERATE PAIN (5)

## 2022-04-27 NOTE — LETTER
"    4/27/2022         RE: Esperanza Velarde  [address] [address] [place] [id number]        Dear Colleague,    Thank you for referring your patient, Esperanza Velarde, to the [facility]. Please see a copy of my visit note below.    ORTHOPEDIC CLINIC CONSULT    Chief Complaint: Esperanza Velarde is a 41 year old female who is being seen for   Chief Complaint   Patient presents with     Shoulder Pain     Bilateral shoulder pain. The left is the worse      Consult     Ref: Malu Rae     Referred by: Malu Rae PA-C    Here for Orthopedic consultation.    History of Present Illness:     Patient reports bilateral shoulder pain with left greater than right.  Patient describes the pain is primarily along the posterior side of her shoulder blade as well as anterior to the clavicle.  Patient states for the most part she has maintained her range of motion but the pain does limit her range of motion.  She reports she has had several years of pain.  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  Numerous years ago she did a steroid pack which did decrease the pain for a while.  Patient indicates left greater than right as the worst pain.  Patient has also seen a few chiropractic providers which report nerves are pinched.  Patient states that she bumps her elbow and her shoulder is jammed upward then she will \"see stars\".  Patient describes reaching out is very difficult and difficult to get close on and off.  Patient states she cannot lie on her left side.  She also indicates that her shoulders are tender even to the touch.  She admits she does not take much for over-the-counter pain medications.  Patient states her pain level 5 out of 10.  Patient states at rest she does not have a whole lot of pain however if she is out and about just the weight of her arms can cause some additional pain.  Location: bilateral shoulder, upper back/scapular area   When the " pain began:   Many years(s)> 3  Mechanism of Injury: No trauma or inciting event.  Level of Pain: 5/10  Pain Quality: sharp, electric shock, throbbing and stabbing   Pain is better with: Has had previous steroid pack with good results  Pain is worse with:  overhead reaching, reaching behind body, reaching forward, reaching across body  Treatment so far consists of: Chiropractic and oral steroids..   Associated Features: loss of motion secondary to pain  Limitations due to condition:  Pain is limiting normal activities of daily living. Pain is waking at night.    Patient's past medical, surgical, social and family histories reviewed.     Past Medical History:   Diagnosis Date     Endometriosis      Infertility, female        Past Surgical History:   Procedure Laterality Date     COLONOSCOPY N/A 10/8/2019    Procedure: COLONOSCOPY;  Surgeon: Shahram Montoya DO;  Location: [facility]     HYSTERECTOMY, PAP NO LONGER INDICATED       TUBAL/ECTOPIC PREGNANCY  1998    Ectopic     TUBAL/ECTOPIC PREGNANCY  2009    Tube Remove       Home Medications:  Prior to Admission medications    Medication Sig Start Date End Date Taking? Authorizing Provider   cephALEXin (KEFLEX) 500 MG capsule Take 1 capsule (500 mg) by mouth 2 times daily for 10 days 4/18/22 4/28/22 Yes Malu Rae PA-C     No Known Allergies    Social History     Occupational History     Not on file   Tobacco Use     Smoking status: Current Every Day Smoker     Packs/day: 0.50     Types: Cigarettes     Smokeless tobacco: Never Used   Vaping Use     Vaping Use: Never used   Substance and Sexual Activity     Alcohol use: Yes     Comment: 1-2 weekly     Drug use: No     Sexual activity: Yes     Partners: Male       Family History   Problem Relation Age of Onset     Lung Cancer Mother 68        mets every where by the time they found it     Cerebrovascular Disease Mother 68     Lung Cancer Father         agent orange     Colon Cancer Maternal Grandmother 50      "Lung Cancer Maternal Grandmother        REVIEW OF SYSTEMS    General: Negative for fever or fatigue    Psych:  negative anxiety or depression     Ophthalmic:  Corrective lenses?  No    ENT:  Hearing difficulty? No    CV: negative for chest pain, venous insufficiency, no history of MI or stroke    Endocrine:  negative diabetes     Urology:  negative kidney disease    Resp:  Normal respiratory effort, no history of pulmonary disease or asthma    Skin: negative for cuts/sores or redness    Musculoskeletal: as above    Neurologic:negative for numbness/tingling    Hematologic: negative for bleeding disorder, does not use of prescription anticoagulants       Physical Exam:    Vitals: /70   Ht 1.74 m (5' 8.5\")   Wt 74.8 kg (165 lb)   LMP 07/02/2013   BMI 24.72 kg/m    BMI= Body mass index is 24.72 kg/m .    GENERAL APPEARANCE: Healthy, alert, no distress    SKIN:  negative for suspicious lesions or rashes, numerous tattoos on the back and shoulders    NEURO: Normal strength and tone, mentation intact and speech normal    PSYCH:   Mentation appears Normal and affect normal/bright    RESPIRATORY: negative for respiratory distress.    EYES: negative for Conjunctivitis    Cardiovascular: No vascular discoloration;  Fingers warm and well perfused, brisk capillary refill.    JOINT/EXTREMITIES:  bilateral   SHOULDER Exam-Bilateral   Inspection: There is no visible deformity, redness, swelling   Tenderness of: Left AC joint as well as the left posterior subacromial with even light.  Patient is nontender to direct palpation on the right primarily with may be subtle tenderness subacromial on the right side   Range of Motion: Active right- forward flexion- 180 degrees, abduction- 180 degrees, external rotation- 45 degrees, internal rotation- T12  Active left- forward flexion- 150 degrees, abduction- 90 degrees, external rotation- 30 degrees, internal rotation- small of the back/belt line  Range of Motion: Passive-patient " "with similar range of motion as above.  There is no particular \"endpoint\" however patient resists additional range of motion secondary to pain   Strength: Patient displays ability to resist in range of motion maneuvers of resisted abduction, internal rotation and external rotation   Special tests: Neers- POSITIVE, Mcdonald(supraspinatous)- POSITIVE, cross arm adduction- POSITIVE left only and Speeds- negative, empty can test is positive bilateral     Radiographs:   Recent Results (from the past 744 hour(s))   XR Shoulder Bilateral G/E 2 Views    Narrative    XR SHOULDER BILATERAL G/E 2 VIEWS 4/27/2022 8:15 AM     HISTORY: Right shoulder pain; Left shoulder pain    COMPARISON: None.      Impression    IMPRESSION:     Right: Normal glenohumeral alignment. The acromioclavicular joint is  unremarkable. No fractures are identified.    Left: Normal glenohumeral alignment. The acromioclavicular joint is  unremarkable. No fractures are identified.     THIAGO QUINONES MD         SYSTEM ID:  [id number]     bilateral shoulder X-ray: Well preserved glenohumeral articular space. No fracture, dislocation and or lesion.  AC joint bilateral with extremely subtle spurring.  independent visualization of the films was made.     Impression:      ICD-10-CM    1. AC joint arthropathy left  M19.019    2. Chronic pain of both shoulders  M25.511 Orthopedic  Referral    G89.29     M25.512    3. Tendinitis of both rotator cuffs  M75.81     M75.82      Patient is experiencing bilateral subacromial impingement symptoms/tendinitis.  She does have good strength with rotator cuff testing.  Patient does have additional AC joint arthropathy symptoms in the left shoulder only.  Patient has had years of this pain with previous treatment of steroid pack which decreased the pain for a little while but this was years ago.  Patient is also seeing chiropractic and had some physical therapy.  Differential could be development of adhesive " capsulitis of the left.    Plan:  All of the above pertinent physical exam and imaging modalities findings was reviewed with Esperanza.  Past treatment as well as potential treatment for current issues are reviewed with the patient.  Patient did have good relief with previous steroid pack that has been a couple years ago.  With severity of patient's current condition cortisone injection would be preferable not only for treatment but for diagnostic reasons.  Patient reports a significant aversion to needles.  Patient has not tried over-the-counter anti-inflammatories.  She was given a guideline sheet therapeutic anti-inflammatory recommendations.  She will likely utilize the Aleve route which is 2 tablets 2 times a day with food.  Formal physical therapy would be a good option however we have to get pain managed first.  Patient will follow-up in 2 to 3 weeks if pain persists.  Patient is aware that cortisone injection will likely be considered at that time.    BP Readings from Last 1 Encounters:   04/27/22 104/70     BP noted to be well controlled today in office.     Mary Delgado PA-C   4/27/2022  1:01 PM          Again, thank you for allowing me to participate in the care of your patient.        Sincerely,        Mary Delgado PA-C

## 2022-04-27 NOTE — PROGRESS NOTES
"ORTHOPEDIC CLINIC CONSULT    Chief Complaint: Esperanza Velarde is a 41 year old female who is being seen for   Chief Complaint   Patient presents with     Shoulder Pain     Bilateral shoulder pain. The left is the worse      Consult     Ref: Malu Rae     Referred by: Malu Rae PA-C    Here for Orthopedic consultation.    History of Present Illness:     Patient reports bilateral shoulder pain with left greater than right.  Patient describes the pain is primarily along the posterior side of her shoulder blade as well as anterior to the clavicle.  Patient states for the most part she has maintained her range of motion but the pain does limit her range of motion.  She reports she has had several years of pain.  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  Numerous years ago she did a steroid pack which did decrease the pain for a while.  Patient indicates left greater than right as the worst pain.  Patient has also seen a few chiropractic providers which report nerves are pinched.  Patient states that she bumps her elbow and her shoulder is jammed upward then she will \"see stars\".  Patient describes reaching out is very difficult and difficult to get close on and off.  Patient states she cannot lie on her left side.  She also indicates that her shoulders are tender even to the touch.  She admits she does not take much for over-the-counter pain medications.  Patient states her pain level 5 out of 10.  Patient states at rest she does not have a whole lot of pain however if she is out and about just the weight of her arms can cause some additional pain.  Location: bilateral shoulder, upper back/scapular area   When the pain began:   Many years(s)> 3  Mechanism of Injury: No trauma or inciting event.  Level of Pain: 5/10  Pain Quality: sharp, electric shock, throbbing and stabbing   Pain is better with: Has had previous steroid pack with good results  Pain is worse with:  " overhead reaching, reaching behind body, reaching forward, reaching across body  Treatment so far consists of: Chiropractic and oral steroids..   Associated Features: loss of motion secondary to pain  Limitations due to condition:  Pain is limiting normal activities of daily living. Pain is waking at night.    Patient's past medical, surgical, social and family histories reviewed.     Past Medical History:   Diagnosis Date     Endometriosis      Infertility, female        Past Surgical History:   Procedure Laterality Date     COLONOSCOPY N/A 10/8/2019    Procedure: COLONOSCOPY;  Surgeon: Shahram Montoya DO;  Location: [facility]     HYSTERECTOMY, PAP NO LONGER INDICATED       TUBAL/ECTOPIC PREGNANCY  1998    Ectopic     TUBAL/ECTOPIC PREGNANCY  2009    Tube Remove       Home Medications:  Prior to Admission medications    Medication Sig Start Date End Date Taking? Authorizing Provider   cephALEXin (KEFLEX) 500 MG capsule Take 1 capsule (500 mg) by mouth 2 times daily for 10 days 4/18/22 4/28/22 Yes Malu Rae PA-C     No Known Allergies    Social History     Occupational History     Not on file   Tobacco Use     Smoking status: Current Every Day Smoker     Packs/day: 0.50     Types: Cigarettes     Smokeless tobacco: Never Used   Vaping Use     Vaping Use: Never used   Substance and Sexual Activity     Alcohol use: Yes     Comment: 1-2 weekly     Drug use: No     Sexual activity: Yes     Partners: Male       Family History   Problem Relation Age of Onset     Lung Cancer Mother 68        mets every where by the time they found it     Cerebrovascular Disease Mother 68     Lung Cancer Father         agent orange     Colon Cancer Maternal Grandmother 50     Lung Cancer Maternal Grandmother        REVIEW OF SYSTEMS    General: Negative for fever or fatigue    Psych:  negative anxiety or depression     Ophthalmic:  Corrective lenses?  No    ENT:  Hearing difficulty? No    CV: negative for chest pain, venous  "insufficiency, no history of MI or stroke    Endocrine:  negative diabetes     Urology:  negative kidney disease    Resp:  Normal respiratory effort, no history of pulmonary disease or asthma    Skin: negative for cuts/sores or redness    Musculoskeletal: as above    Neurologic:negative for numbness/tingling    Hematologic: negative for bleeding disorder, does not use of prescription anticoagulants       Physical Exam:    Vitals: /70   Ht 1.74 m (5' 8.5\")   Wt 74.8 kg (165 lb)   LMP 07/02/2013   BMI 24.72 kg/m    BMI= Body mass index is 24.72 kg/m .    GENERAL APPEARANCE: Healthy, alert, no distress    SKIN:  negative for suspicious lesions or rashes, numerous tattoos on the back and shoulders    NEURO: Normal strength and tone, mentation intact and speech normal    PSYCH:   Mentation appears Normal and affect normal/bright    RESPIRATORY: negative for respiratory distress.    EYES: negative for Conjunctivitis    Cardiovascular: No vascular discoloration;  Fingers warm and well perfused, brisk capillary refill.    JOINT/EXTREMITIES:  bilateral   SHOULDER Exam-Bilateral   Inspection: There is no visible deformity, redness, swelling   Tenderness of: Left AC joint as well as the left posterior subacromial with even light.  Patient is nontender to direct palpation on the right primarily with may be subtle tenderness subacromial on the right side   Range of Motion: Active right- forward flexion- 180 degrees, abduction- 180 degrees, external rotation- 45 degrees, internal rotation- T12  Active left- forward flexion- 150 degrees, abduction- 90 degrees, external rotation- 30 degrees, internal rotation- small of the back/belt line  Range of Motion: Passive-patient with similar range of motion as above.  There is no particular \"endpoint\" however patient resists additional range of motion secondary to pain   Strength: Patient displays ability to resist in range of motion maneuvers of resisted abduction, internal " rotation and external rotation   Special tests: Neers- POSITIVE, Mcdonald(supraspinatous)- POSITIVE, cross arm adduction- POSITIVE left only and Speeds- negative, empty can test is positive bilateral     Radiographs:   Recent Results (from the past 744 hour(s))   XR Shoulder Bilateral G/E 2 Views    Narrative    XR SHOULDER BILATERAL G/E 2 VIEWS 4/27/2022 8:15 AM     HISTORY: Right shoulder pain; Left shoulder pain    COMPARISON: None.      Impression    IMPRESSION:     Right: Normal glenohumeral alignment. The acromioclavicular joint is  unremarkable. No fractures are identified.    Left: Normal glenohumeral alignment. The acromioclavicular joint is  unremarkable. No fractures are identified.     THIAGO QUINONES MD         SYSTEM ID:  [id number]     bilateral shoulder X-ray: Well preserved glenohumeral articular space. No fracture, dislocation and or lesion.  AC joint bilateral with extremely subtle spurring.  independent visualization of the films was made.     Impression:      ICD-10-CM    1. AC joint arthropathy left  M19.019    2. Chronic pain of both shoulders  M25.511 Orthopedic  Referral    G89.29     M25.512    3. Tendinitis of both rotator cuffs  M75.81     M75.82      Patient is experiencing bilateral subacromial impingement symptoms/tendinitis.  She does have good strength with rotator cuff testing.  Patient does have additional AC joint arthropathy symptoms in the left shoulder only.  Patient has had years of this pain with previous treatment of steroid pack which decreased the pain for a little while but this was years ago.  Patient is also seeing chiropractic and had some physical therapy.  Differential could be development of adhesive capsulitis of the left.    Plan:  All of the above pertinent physical exam and imaging modalities findings was reviewed with Esperanza.  Past treatment as well as potential treatment for current issues are reviewed with the patient.  Patient did have good relief  with previous steroid pack that has been a couple years ago.  With severity of patient's current condition cortisone injection would be preferable not only for treatment but for diagnostic reasons.  Patient reports a significant aversion to needles.  Patient has not tried over-the-counter anti-inflammatories.  She was given a guideline sheet therapeutic anti-inflammatory recommendations.  She will likely utilize the Aleve route which is 2 tablets 2 times a day with food.  Formal physical therapy would be a good option however we have to get pain managed first.  Patient will follow-up in 2 to 3 weeks if pain persists.  Patient is aware that cortisone injection will likely be considered at that time.    BP Readings from Last 1 Encounters:   04/27/22 104/70     BP noted to be well controlled today in office.     Mary Delgado PA-C   4/27/2022  1:01 PM

## 2022-04-27 NOTE — NURSING NOTE
Chief Complaint   Patient presents with     Shoulder Pain     Bilateral shoulder pain. The left is the worse      Consult     Ref: Malu Rae

## 2023-01-08 ENCOUNTER — HEALTH MAINTENANCE LETTER (OUTPATIENT)
Age: 43
End: 2023-01-08

## 2023-02-27 ENCOUNTER — ANCILLARY ORDERS (OUTPATIENT)
Dept: FAMILY MEDICINE | Facility: CLINIC | Age: 43
End: 2023-02-27

## 2023-02-27 ENCOUNTER — HOSPITAL ENCOUNTER (OUTPATIENT)
Dept: MAMMOGRAPHY | Facility: CLINIC | Age: 43
Discharge: HOME | End: 2023-02-27
Attending: PHYSICIAN ASSISTANT | Admitting: PHYSICIAN ASSISTANT
Payer: COMMERCIAL

## 2023-02-27 DIAGNOSIS — Z12.31 VISIT FOR SCREENING MAMMOGRAM: ICD-10-CM

## 2023-02-27 PROCEDURE — 77067 SCR MAMMO BI INCL CAD: CPT

## 2023-03-13 ENCOUNTER — OFFICE VISIT (OUTPATIENT)
Dept: ORTHOPEDICS | Facility: CLINIC | Age: 43
End: 2023-03-13
Payer: COMMERCIAL

## 2023-03-13 VITALS
HEIGHT: 68 IN | SYSTOLIC BLOOD PRESSURE: 120 MMHG | BODY MASS INDEX: 25.16 KG/M2 | WEIGHT: 166 LBS | DIASTOLIC BLOOD PRESSURE: 84 MMHG

## 2023-03-13 DIAGNOSIS — S46.002A INJURY OF LEFT ROTATOR CUFF, INITIAL ENCOUNTER: Primary | ICD-10-CM

## 2023-03-13 DIAGNOSIS — M25.612 SHOULDER STIFFNESS, LEFT: ICD-10-CM

## 2023-03-13 PROCEDURE — 99214 OFFICE O/P EST MOD 30 MIN: CPT | Performed by: PHYSICIAN ASSISTANT

## 2023-03-13 ASSESSMENT — PAIN SCALES - GENERAL: PAINLEVEL: MILD PAIN (3)

## 2023-03-13 NOTE — LETTER
3/13/2023         RE: Esperanza Velarde  [id number] [address] [place] [id number]        Dear Colleague,    Thank you for referring your patient, Esperanza Velarde, to the [facility]. Please see a copy of my visit note below.    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  She enjoys camping.    She is being seen for   Chief Complaints and History of Present Illnesses   Patient presents with     RECHECK     AC joint arthropathy left      I reviewed the note by Mary Azar from 4/27/2022 in detail.    History of Present Illness:   Mechanism of Injury: No injury fall or trauma that she can think of.  Location: Left shoulder  Duration of Pain: 10 years  Rating of Pain: 3 out of 10  Pain Quality: Achy but can be sharp and quick pain  Pain is better with: Rest  Pain is worse with: Range of motion and stretching the shoulder  Treatment so far consists of: Patient was last seen by Mary Delgado PA-C at that time it was 4/27/2022 and she offered a steroid injection but patient wanted to avoid needles if possible.  She gave her some guidelines for anti-inflammatory medication such as Aleve.  PT was discussed but not ordered.  Patient states that she has tried chiropractic in the past which has not helped much.  She has tried oral steroids which have helped slightly.  She has not had any formal therapy or injections.  Patient also states that she is asked for an MRI on her left shoulder over the last 10 years and never received 1.   Associated Features: Denies numbness or tingling shooting burning or electric pain.  Denies any neck pain.   Prior history of related problems: No previous surgery or trauma or fracture to the left shoulder  Pain is Limiting: Using the shoulder above shoulder level  Here  "to: Orthopedic consultation, possible steroid injection  The Pain Has: Gotten worse  Additional History: Patient is here with her Sister Ira      Patient's past medical, surgical, social and family histories reviewed.     Past Medical History:   Diagnosis Date     Endometriosis      Infertility, female         Past Surgical History:   Procedure Laterality Date     COLONOSCOPY N/A 10/8/2019    Procedure: COLONOSCOPY;  Surgeon: Shahram Montoya DO;  Location: [facility]     HYSTERECTOMY, PAP NO LONGER INDICATED       TUBAL/ECTOPIC PREGNANCY  1998    Ectopic     TUBAL/ECTOPIC PREGNANCY  2009    Tube Remove       Medications:  No current outpatient medications on file prior to visit.  No current facility-administered medications on file prior to visit.      No Known Allergies    Social History     Occupational History     Not on file   Tobacco Use     Smoking status: Every Day     Packs/day: 0.50     Types: Cigarettes     Smokeless tobacco: Never   Vaping Use     Vaping Use: Never used   Substance and Sexual Activity     Alcohol use: Yes     Comment: 1-2 weekly     Drug use: No     Sexual activity: Yes     Partners: Male       Family History   Problem Relation Age of Onset     Lung Cancer Mother 68        mets every where by the time they found it     Cerebrovascular Disease Mother 68     Lung Cancer Father         agent orange     Colon Cancer Maternal Grandmother 50     Lung Cancer Maternal Grandmother        REVIEW OF SYSTEMS  10 point review systems performed otherwise negative as noted as per history of present illness.    Physical Exam:  Vitals: /84   Ht 1.727 m (5' 8\")   Wt 75.3 kg (166 lb)   LMP 07/02/2013   BMI 25.24 kg/m    BMI= Body mass index is 25.24 kg/m .    Constitutional: healthy, alert and no acute distress   Psychiatric: mentation appears normal and affect normal/bright  NEURO: no focal deficits, CMS intact left upper extremity   RESP: Normal with easy respirations and no use of " accessory muscles to breathe, no audible wheezing or retractions  CV: +2 radial pulse and her hand is warm to palpation.   SKIN: No erythema, rashes, excoriation, or breakdown. No evidence of infection.   MUSCULOSKELETAL:    INSPECTION of left shoulder: No gross deformities, erythema, edema, ecchymosis, atrophy or fasciculations.     PALPATION: Tenderness lateral shoulder.  No tenderness AC joint, proximal biceps tendon, clavicle, posterior shoulder, trapezius area. No increased warmth noted.     ROM: Passive: Forward flexion to 120 degrees compared to on the contralateral side 180 , abduction to 90 degrees compared to on the contralateral side 180 , external rotation to 70 degrees and symmetrical, internal rotation to lumbar spine.  All range of motion is without catching, locking but there is pain especially with forward flexion and abduction.  I did notice increased range of motion slowly with holding the arm at max range of motion with forward flexion and abduction.  Active: Forward flexion to 90 degrees and abduction to 70 degrees.      STRENGTH: 5 out of 5 , deltoid as well as internal and external rotation. 5 out of 5 supraspinatus, infraspinatus and subscapularis.      SPECIAL TEST: Patient has a negative Neer test, negative Mcdonald sign, negative cross over test, negative bear hug test and negative liftoff test, negative empty can and full can test, negative external rotator lag sign, negative drop test.  Unable to do apprehension test due to restricted range of motion.  Negative crank test.  GAIT: non-antalgic  Lymph: no palpable lymph nodes    Diagnostic Modalities:  I reviewed the x-rays that were done of bilateral shoulders on 4/22/2022 that shows no fracture no dislocation no tumor no downsloping acromion and no high riding humerus is.  Glenohumeral joint with adequate spacing as well as AC joint.    Independent visualization of the images was performed.    Impression: 1.  Left shoulder  supraspinatus rotator cuff tear versus stiffness    Plan:  All of the above pertinent physical exam and imaging modalities findings was reviewed with Esperanza and her sister Ira.    FOCUSED PLAN:  42-year-old female with left shoulder lateral pain and inability to lift arm above shoulder level for 10+ years.  Patient does not recall an injury at that time.  Patient has never received an MRI of the left shoulder.  Patient has good external range of motion so I do not suspect adhesive capsulitis.  Rotator cuff strength is adequate surprisingly today.  I do feel an MRI is warranted with her inability to lift the arm above shoulder level so we ordered this today.  If the MRI is negative we will have the patient back for steroid injection and formal physical therapy to work out the stiffness.  If the MRI is positive for rotator cuff tear we will assess the ability for possible repair versus formal therapy to strengthen rotator cuff muscles around that area.  Follow-up with phone call after MRI.    Re-x-ray on return: No      This note was dictated with [facility].    Victor Manuel Pena PA-C        Again, thank you for allowing me to participate in the care of your patient.        Sincerely,        Victor Manuel Pena PA-C

## 2023-03-13 NOTE — PROGRESS NOTES
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  She enjoys camping.    She is being seen for   Chief Complaints and History of Present Illnesses   Patient presents with     RECHECK     AC joint arthropathy left      I reviewed the note by Mary Azar from 4/27/2022 in detail.    History of Present Illness:   Mechanism of Injury: No injury fall or trauma that she can think of.  Location: Left shoulder  Duration of Pain: 10 years  Rating of Pain: 3 out of 10  Pain Quality: Achy but can be sharp and quick pain  Pain is better with: Rest  Pain is worse with: Range of motion and stretching the shoulder  Treatment so far consists of: Patient was last seen by Mary Delgado PA-C at that time it was 4/27/2022 and she offered a steroid injection but patient wanted to avoid needles if possible.  She gave her some guidelines for anti-inflammatory medication such as Aleve.  PT was discussed but not ordered.  Patient states that she has tried chiropractic in the past which has not helped much.  She has tried oral steroids which have helped slightly.  She has not had any formal therapy or injections.  Patient also states that she is asked for an MRI on her left shoulder over the last 10 years and never received 1.   Associated Features: Denies numbness or tingling shooting burning or electric pain.  Denies any neck pain.   Prior history of related problems: No previous surgery or trauma or fracture to the left shoulder  Pain is Limiting: Using the shoulder above shoulder level  Here to: Orthopedic consultation, possible steroid injection  The Pain Has: Gotten worse  Additional History: Patient is here with her Sister Ira      Patient's past medical, surgical, social and family histories reviewed.     Past Medical History:   Diagnosis Date  "    Endometriosis      Infertility, female         Past Surgical History:   Procedure Laterality Date     COLONOSCOPY N/A 10/8/2019    Procedure: COLONOSCOPY;  Surgeon: Shahram Montoya DO;  Location: [facility]     HYSTERECTOMY, PAP NO LONGER INDICATED       TUBAL/ECTOPIC PREGNANCY  1998    Ectopic     TUBAL/ECTOPIC PREGNANCY  2009    Tube Remove       Medications:  No current outpatient medications on file prior to visit.  No current facility-administered medications on file prior to visit.      No Known Allergies    Social History     Occupational History     Not on file   Tobacco Use     Smoking status: Every Day     Packs/day: 0.50     Types: Cigarettes     Smokeless tobacco: Never   Vaping Use     Vaping Use: Never used   Substance and Sexual Activity     Alcohol use: Yes     Comment: 1-2 weekly     Drug use: No     Sexual activity: Yes     Partners: Male       Family History   Problem Relation Age of Onset     Lung Cancer Mother 68        mets every where by the time they found it     Cerebrovascular Disease Mother 68     Lung Cancer Father         agent orange     Colon Cancer Maternal Grandmother 50     Lung Cancer Maternal Grandmother        REVIEW OF SYSTEMS  10 point review systems performed otherwise negative as noted as per history of present illness.    Physical Exam:  Vitals: /84   Ht 1.727 m (5' 8\")   Wt 75.3 kg (166 lb)   LMP 07/02/2013   BMI 25.24 kg/m    BMI= Body mass index is 25.24 kg/m .    Constitutional: healthy, alert and no acute distress   Psychiatric: mentation appears normal and affect normal/bright  NEURO: no focal deficits, CMS intact left upper extremity   RESP: Normal with easy respirations and no use of accessory muscles to breathe, no audible wheezing or retractions  CV: +2 radial pulse and her hand is warm to palpation.   SKIN: No erythema, rashes, excoriation, or breakdown. No evidence of infection.   MUSCULOSKELETAL:    INSPECTION of left shoulder: No gross " deformities, erythema, edema, ecchymosis, atrophy or fasciculations.     PALPATION: Tenderness lateral shoulder.  No tenderness AC joint, proximal biceps tendon, clavicle, posterior shoulder, trapezius area. No increased warmth noted.     ROM: Passive: Forward flexion to 120 degrees compared to on the contralateral side 180 , abduction to 90 degrees compared to on the contralateral side 180 , external rotation to 70 degrees and symmetrical, internal rotation to lumbar spine.  All range of motion is without catching, locking but there is pain especially with forward flexion and abduction.  I did notice increased range of motion slowly with holding the arm at max range of motion with forward flexion and abduction.  Active: Forward flexion to 90 degrees and abduction to 70 degrees.      STRENGTH: 5 out of 5 , deltoid as well as internal and external rotation. 5 out of 5 supraspinatus, infraspinatus and subscapularis.      SPECIAL TEST: Patient has a negative Neer test, negative Mcdonald sign, negative cross over test, negative bear hug test and negative liftoff test, negative empty can and full can test, negative external rotator lag sign, negative drop test.  Unable to do apprehension test due to restricted range of motion.  Negative crank test.  GAIT: non-antalgic  Lymph: no palpable lymph nodes    Diagnostic Modalities:  I reviewed the x-rays that were done of bilateral shoulders on 4/22/2022 that shows no fracture no dislocation no tumor no downsloping acromion and no high riding humerus is.  Glenohumeral joint with adequate spacing as well as AC joint.    Independent visualization of the images was performed.    Impression: 1.  Left shoulder supraspinatus rotator cuff tear versus stiffness    Plan:  All of the above pertinent physical exam and imaging modalities findings was reviewed with Esperanza and her sister Ira.    FOCUSED PLAN:  42-year-old female with left shoulder lateral pain and inability to lift arm  above shoulder level for 10+ years.  Patient does not recall an injury at that time.  Patient has never received an MRI of the left shoulder.  Patient has good external range of motion so I do not suspect adhesive capsulitis.  Rotator cuff strength is adequate surprisingly today.  I do feel an MRI is warranted with her inability to lift the arm above shoulder level so we ordered this today.  If the MRI is negative we will have the patient back for steroid injection and formal physical therapy to work out the stiffness.  If the MRI is positive for rotator cuff tear we will assess the ability for possible repair versus formal therapy to strengthen rotator cuff muscles around that area.  Follow-up with phone call after MRI.    Re-x-ray on return: No      This note was dictated with [employer].    Victor Manuel Pena PA-C

## 2023-03-16 ENCOUNTER — HOSPITAL ENCOUNTER (OUTPATIENT)
Dept: MRI IMAGING | Facility: CLINIC | Age: 43
Discharge: HOME | End: 2023-03-16
Attending: PHYSICIAN ASSISTANT | Admitting: PHYSICIAN ASSISTANT
Payer: COMMERCIAL

## 2023-03-16 DIAGNOSIS — S46.002A INJURY OF LEFT ROTATOR CUFF, INITIAL ENCOUNTER: ICD-10-CM

## 2023-03-16 PROCEDURE — 73221 MRI JOINT UPR EXTREM W/O DYE: CPT | Mod: LT

## 2023-03-17 ENCOUNTER — TELEPHONE (OUTPATIENT)
Dept: ORTHOPEDICS | Facility: CLINIC | Age: 43
End: 2023-03-17
Payer: COMMERCIAL

## 2023-03-17 NOTE — TELEPHONE ENCOUNTER
I reviewed this patients MRI.  She does not have a rotator cuff tear.  Patient results reviewed over the phone.  Patient was to return to clinic and get a steroid injection which I relayed that information to her and also work on physical therapy.  Patient advised that it is going to be tough and probably hurt but she has to work with therapy to get the range of motion back.  The patient is planning on working with her niece who is a physical therapist.  She did not need an order for this.  Niece is to work out the stiffness.  Is going to see if she can get the stiffness without the injection but if she is struggling she is planning on making an appointment and getting a steroid injection.  I did tell her I think a steroid injection would make getting the stiffness out a little easier.  She understands and she was appreciative for the call and the results.

## 2023-04-23 ENCOUNTER — HEALTH MAINTENANCE LETTER (OUTPATIENT)
Age: 43
End: 2023-04-23

## 2023-08-03 ENCOUNTER — OFFICE VISIT (OUTPATIENT)
Dept: FAMILY MEDICINE | Facility: OTHER | Age: 43
End: 2023-08-03
Attending: NURSE PRACTITIONER
Payer: COMMERCIAL

## 2023-08-03 VITALS
HEIGHT: 68 IN | SYSTOLIC BLOOD PRESSURE: 118 MMHG | HEART RATE: 84 BPM | WEIGHT: 168.5 LBS | DIASTOLIC BLOOD PRESSURE: 60 MMHG | OXYGEN SATURATION: 98 % | BODY MASS INDEX: 25.54 KG/M2 | TEMPERATURE: 97.6 F

## 2023-08-03 DIAGNOSIS — M25.512 ACUTE PAIN OF LEFT SHOULDER: Primary | ICD-10-CM

## 2023-08-03 DIAGNOSIS — Z76.89 ENCOUNTER TO ESTABLISH CARE: ICD-10-CM

## 2023-08-03 DIAGNOSIS — Z87.891 PERSONAL HISTORY OF TOBACCO USE, PRESENTING HAZARDS TO HEALTH: ICD-10-CM

## 2023-08-03 PROCEDURE — 99203 OFFICE O/P NEW LOW 30 MIN: CPT | Performed by: NURSE PRACTITIONER

## 2023-08-03 ASSESSMENT — ANXIETY QUESTIONNAIRES
4. TROUBLE RELAXING: NOT AT ALL
1. FEELING NERVOUS, ANXIOUS, OR ON EDGE: NOT AT ALL
2. NOT BEING ABLE TO STOP OR CONTROL WORRYING: NOT AT ALL
7. FEELING AFRAID AS IF SOMETHING AWFUL MIGHT HAPPEN: NOT AT ALL
3. WORRYING TOO MUCH ABOUT DIFFERENT THINGS: NOT AT ALL
6. BECOMING EASILY ANNOYED OR IRRITABLE: NOT AT ALL
5. BEING SO RESTLESS THAT IT IS HARD TO SIT STILL: NOT AT ALL
IF YOU CHECKED OFF ANY PROBLEMS ON THIS QUESTIONNAIRE, HOW DIFFICULT HAVE THESE PROBLEMS MADE IT FOR YOU TO DO YOUR WORK, TAKE CARE OF THINGS AT HOME, OR GET ALONG WITH OTHER PEOPLE: NOT DIFFICULT AT ALL
GAD7 TOTAL SCORE: 0
GAD7 TOTAL SCORE: 0

## 2023-08-03 ASSESSMENT — PAIN SCALES - GENERAL: PAINLEVEL: SEVERE PAIN (7)

## 2023-08-03 NOTE — PROGRESS NOTES
"  Assessment & Plan     (M25.512) Acute pain of left shoulder  (primary encounter diagnosis)  Comment: Ref to OA for shoulder injection   Plan: Orthopedic  Referral            (Z76.89) Encounter to establish care  Comment: Reviewed past medical, surgical, and family history. Reviewed medications. Care established   Plan: As above       Nicotine/Tobacco Cessation:  She reports that she has been smoking cigarettes. She started smoking about 27 years ago. She has been smoking an average of .5 packs per day. She has never used smokeless tobacco.  Nicotine/Tobacco Cessation Plan:   Information offered: Patient not interested at this time      BMI:   Estimated body mass index is 25.62 kg/m  as calculated from the following:    Height as of this encounter: 1.727 m (5' 8\").    Weight as of this encounter: 76.4 kg (168 lb 8 oz).       See Patient Instructions    Return in about 6 months (around 2/3/2024) for Fasting physical .    [facility]    Marii Mata is a 42 year old, presenting for the following health issues:  Establish Care      8/3/2023     1:36 PM   Additional Questions   Roomed by Viktoriya PINEDA   Accompanied by self         8/3/2023     1:36 PM   Patient Reported Additional Medications   Patient reports taking the following new medications none       HPI     Pain History:  When did you first notice your pain? Ongoing for 10 years, but pain has increased in the past couple of weeks  Have you seen this provider for your pain in the past? No   Where in your body do your have pain? Left shoulder  Are you seeing anyone else for your pain? No  What makes your pain better? Wants a cortisone shot if possible  What makes your pain worse? Nothing is working  How has pain affected your ability to work? Can work part time without limitations   What type of work do you or did you do? Part time at [employer]  Who lives in your household?  and daughter "   Denies injury or trauma to left arm.     Right hand dominant.         3/11/2020     4:14 PM 8/3/2023     1:31 PM   PHQ-9 SCORE   PHQ-9 Total Score MyChart 0 0   PHQ-9 Total Score 0 0           3/11/2020     4:15 PM 8/3/2023     1:31 PM   KRYSTINA-7 SCORE   Total Score 0 (minimal anxiety) 0 (minimal anxiety)   Total Score 0 0       Review of Systems   Constitutional, HEENT, cardiovascular, pulmonary, gi and gu systems are negative, except as otherwise noted.      Objective    LMP 07/02/2013   There is no height or weight on file to calculate BMI.  Physical Exam   GENERAL: healthy, alert and no distress  RESP: lungs clear to auscultation - no rales, rhonchi or wheezes  CV: regular rate and rhythm, normal S1 S2, no S3 or S4, no murmur, click or rub, no peripheral edema and peripheral pulses strong  MS: no gross musculoskeletal defects noted, no edema. CMS and limited ROM to left upper extremity. TTP to anterior rotator cuff region. Distal pulses intact. No rash, erythema, bruising, or warmth to the touch   SKIN: no suspicious lesions or rashes  PSYCH: mentation appears normal, affect normal/bright      Answers submitted by the patient for this visit:  Patient Health Questionnaire (Submitted on 8/3/2023)  If you checked off any problems, how difficult have these problems made it for you to do your work, take care of things at home, or get along with other people?: Not difficult at all  PHQ9 TOTAL SCORE: 0  KRYSTINA-7 (Submitted on 8/3/2023)  KRYSTINA 7 TOTAL SCORE: 0

## 2023-08-15 ENCOUNTER — TRANSFERRED RECORDS (OUTPATIENT)
Dept: HEALTH INFORMATION MANAGEMENT | Facility: HOSPITAL | Age: 43
End: 2023-08-15

## 2023-09-26 ENCOUNTER — TRANSFERRED RECORDS (OUTPATIENT)
Dept: HEALTH INFORMATION MANAGEMENT | Facility: CLINIC | Age: 43
End: 2023-09-26
Payer: COMMERCIAL

## 2024-06-29 ENCOUNTER — HEALTH MAINTENANCE LETTER (OUTPATIENT)
Age: 44
End: 2024-06-29

## 2024-12-27 ENCOUNTER — HOSPITAL ENCOUNTER (OUTPATIENT)
Dept: MAMMOGRAPHY | Facility: OTHER | Age: 44
Discharge: HOME | End: 2024-12-27
Attending: NURSE PRACTITIONER | Admitting: NURSE PRACTITIONER
Payer: COMMERCIAL

## 2024-12-27 DIAGNOSIS — Z12.31 VISIT FOR SCREENING MAMMOGRAM: ICD-10-CM

## 2024-12-27 PROCEDURE — 77063 BREAST TOMOSYNTHESIS BI: CPT

## 2024-12-27 PROCEDURE — 77067 SCR MAMMO BI INCL CAD: CPT

## 2025-07-13 ENCOUNTER — HEALTH MAINTENANCE LETTER (OUTPATIENT)
Age: 45
End: 2025-07-13